# Patient Record
Sex: FEMALE | Race: WHITE | Employment: UNEMPLOYED | URBAN - METROPOLITAN AREA
[De-identification: names, ages, dates, MRNs, and addresses within clinical notes are randomized per-mention and may not be internally consistent; named-entity substitution may affect disease eponyms.]

---

## 2019-06-21 ENCOUNTER — ROUTINE PRENATAL (OUTPATIENT)
Dept: OBGYN CLINIC | Age: 29
End: 2019-06-21
Payer: MEDICARE

## 2019-06-21 VITALS
DIASTOLIC BLOOD PRESSURE: 73 MMHG | HEIGHT: 62 IN | SYSTOLIC BLOOD PRESSURE: 109 MMHG | BODY MASS INDEX: 34.08 KG/M2 | HEART RATE: 68 BPM | WEIGHT: 185.2 LBS

## 2019-06-21 DIAGNOSIS — O99.211 OBESITY AFFECTING PREGNANCY IN FIRST TRIMESTER: Primary | ICD-10-CM

## 2019-06-21 DIAGNOSIS — Z13.79 ENCOUNTER FOR OTHER SCREENING FOR GENETIC AND CHROMOSOMAL ANOMALIES: ICD-10-CM

## 2019-06-21 DIAGNOSIS — Z3A.12 12 WEEKS GESTATION OF PREGNANCY: ICD-10-CM

## 2019-06-21 LAB
GLUCOSE URINE, POC: NORMAL
PROTEIN UA: NEGATIVE

## 2019-06-21 PROCEDURE — 81002 URINALYSIS NONAUTO W/O SCOPE: CPT | Performed by: OBSTETRICS & GYNECOLOGY

## 2019-06-21 PROCEDURE — G8427 DOCREV CUR MEDS BY ELIG CLIN: HCPCS | Performed by: OBSTETRICS & GYNECOLOGY

## 2019-06-21 PROCEDURE — 76813 OB US NUCHAL MEAS 1 GEST: CPT | Performed by: OBSTETRICS & GYNECOLOGY

## 2019-06-21 PROCEDURE — 99242 OFF/OP CONSLTJ NEW/EST SF 20: CPT | Performed by: OBSTETRICS & GYNECOLOGY

## 2019-06-21 PROCEDURE — 99201 HC NEW PT, E/M LEVEL 1: CPT | Performed by: OBSTETRICS & GYNECOLOGY

## 2019-06-21 PROCEDURE — G8417 CALC BMI ABV UP PARAM F/U: HCPCS | Performed by: OBSTETRICS & GYNECOLOGY

## 2019-06-21 SDOH — HEALTH STABILITY: MENTAL HEALTH: HOW OFTEN DO YOU HAVE A DRINK CONTAINING ALCOHOL?: NEVER

## 2019-06-21 NOTE — LETTER
 Neural Tube Defect No     Congenital Heart Defect No     Down Syndrome No     Adrián-Sachs No     Sickle Cell Disease or Trait No     Hemophilia No     Muscular Dystrophy No     Cystic Fibrosis No     Dunn Chorea No     Mental Retardation/Autism No     Was Person Treated for Fragilex? No     Other Inherited Genetic Chromosomal Disorder? No     Maternal Metabolic Disorder No     Patient or [de-identified] Father Had Other Defects? Yes Pt's sons has joined toes on both feet    Recurrent Pregnancy Loss or Still Birth? No      OB Infection History    Live with Someone with or Exposed to TB? No     Patient or Partner has Hx of Genital Herpes? No     Rash or Viral Illness Since LMP? No     History of STD/GC/Chlamydia/HPV/Syphilis? No      Mrs Roula Belcher had an uneventful course of pregnancy so far. When seen today in our office she had no complaints. PHYSICAL EXAMINATION:    General Appearance:  Healthy looking, alert, no acute distress. Eyes:     No pallor, no icterus, no photophobia. Ears:     No ear drainage. Nose:     No nasal drainage, no paranasal sinus tenderness. Throat:   Mucosa moist, no oral thrush, no exudate. Neck:     No nuchal rigidity. Back:     No CVA tenderness. Abdomen:    Soft nontender. Extremities:    No pretibial pitting edema, no calf muscle tenderness. Skin:     No rashes, no lesions. BP: 109/73 Weight: 185 lb 3.2 oz (84 kg) Height: 5' 2\" (157.5 cm) Pulse: 68     Body mass index is 33.87 kg/m². Urine dipstick:  Glucose : Negative   Albumin:  Negative       An ultrasound evaluation was done in our office today. Please refer to the enclosed copy of the ultrasound report for further information. IMPRESSION:  1. A 12w1d intrauterine pregnancy. 2. Obesity. 3. Previous baby with webbing of toes    RECOMMENDATIONS/PLAN:  I discussed with the patient the following points:    1.  The benefits and limitations of the first trimester hormonal screening test along with a nuchal translucency measurement: This test can detect up to 90% of cases of fetal chromosomal anomalies. This means that 10% of the cases are going to be missed by this test.    2. The fact that the 1st trimester test does not screen for birth defects and neural tube defects. An anatomical survey of the baby by ultrasound will be necessary between 16 to 20 weeks to check for birth defects. 3. Screening for neural tube defects will involve maternal serum alpha-fetoprotein to be done in the 2nd trimester or a targeted ultrasound evaluation of the intracranial anatomy and spine. 4. The second trimester hormonal screening test (quad screen)  can detect up to 80% of fetal chromosomal anomalies. It carries however high false positive rate and requires confirmation with a genetic amniocentesis. 5. The fact that only a genetic amniocentesis can detect chromosomal anomalies. It carries, however, a risk of causing fetal loss. ( the risk of loss is quoted to be between 1:200 to 1:500). 6. She declined the genetic amniocentesis. I discussed the cell free DNA test ( [place])  with the patient. I advised her that this a screening test not a diagnostic test.The test screens only for trisomy 21, 18 and 13. She understands that the cell free DNA is  a screening test, it does not replace the diagnostic genetic amniocentesis. She agreed to have the [place] test. It was ordered today. 7. Obesity is assosiated with an increased risk of developing gestational diabetes, and disturbance in the growth of her baby, such as Large for dates and small for Dates. Gestational diabetes should be ruled out with a Glucose tolerance test. If negative it should be repeated at 26-28 weeks of gestation. 8. I recommend a second trimester fetal anatomical survey at 18-20 weeks of gestation. Thank you again, doctor, for allowing us to be of service to your patient. If I can be of further assistance, please do not hesitate to call. Sincerely,        Gabriella Johansen M.D., [address] [facility]    Current encounter billing:  [place] OFFICE CONSULTATION NEW/ESTAB PATIENT 30 MIN [83817]  US Fetal Nuchal Translucency [[id number] CPT(R)]    **This report has been created using voice recognition software.  It may contain minor errors     which are inherent in voice recognition technology**

## 2019-06-21 NOTE — PATIENT INSTRUCTIONS
Call your primary obstetrician with bleeding, leaking of fluid, abdominal tenderness, headache, blurry vision, epigastric pain and increased urinary frequency. Any questions contact Peter at [phone]. If you are experiencing an emergency and need immediate help, call 911 or go to go emergency room or labor and delivery. if you are sick, not feeling well or have an infectious process going on please reschedule your appointment by calling [phone]. Also if any family members are not feeling well, please do not bring them to your appointment. We appreciate your cooperation. We are doing this in order to protect our pregnant mothers+ their babies. Patient Education        Weeks 10 to 14 of Your Pregnancy: Care Instructions  Your Care Instructions    By weeks 10 to 14 of your pregnancy, the placenta has formed inside your uterus. It is possible to hear your baby's heartbeat with a special ultrasound device. Your baby's eyes can and do move. The arms and legs can bend. This is a good time to think about testing for birth defects. There are two types of tests: screening and diagnostic. Screening tests show the chance that a baby has a certain birth defect. They can't tell you for sure that your baby has a problem. Diagnostic tests show if a baby has a certain birth defect. It's your choice whether to have these tests. You and your partner can talk to your doctor or midwife about birth defects tests. Follow-up care is a key part of your treatment and safety. Be sure to make and go to all appointments, and call your doctor if you are having problems. It's also a good idea to know your test results and keep a list of the medicines you take. How can you care for yourself at home? Decide about tests  · You can have screening tests and diagnostic tests to check for birth defects.  The decision to have a test for birth defects is personal. Think about your age, your chance of passing on a family disease, your need to know about any problems, and what you might do after you have the test results. ? Triple or quadruple (quad) blood tests. These screening tests can be done between 15 and 20 weeks of pregnancy. They check the amounts of three or four substances in your blood. The doctor looks at these test results, along with your age and other factors, to find out the chance that your baby may have certain problems. ? Amniocentesis. This diagnostic test is used to look for chromosomal problems in the baby's cells. It can be done between 15 and 20 weeks of pregnancy, usually around week 16.  ? Nuchal translucency test. This test uses ultrasound to measure the thickness of the area at the back of the baby's neck. An increase in the thickness can be an early sign of Down syndrome. ? Chorionic villus sampling (CVS). This is a test that looks for certain genetic problems with your baby. The same genes that are in your baby are in the placenta. A small piece of the placenta is taken out and tested. This test is done when you are 10 to 13 weeks pregnant. Ease discomfort  · Slow down and take naps when you feel tired. · If your emotions swing, talk to someone. Crying, anxiety, and concentration problems are common. · If your gums bleed, try a softer toothbrush. If your gums are puffy and bleed a lot, see your dentist.  · If you feel dizzy:  ? Get up slowly after sitting or lying down. ? Drink plenty of fluids. ? Eat small snacks to keep your blood sugar stable. ? Put your head between your legs as though you were tying your shoelaces. ? Lie down with your legs higher than your head. Use pillows to prop up your feet. · If you have a headache:  ? Lie down. ? Ask your partner or a good friend for a neck massage. ? Try cool cloths over your forehead or across the back of your neck. ? Use acetaminophen (Tylenol) for pain relief.  Do not use nonsteroidal anti-inflammatory drugs (NSAIDs), such as ibuprofen (Advil, Motrin) or naproxen anytime you think you may need emergency care. For example, call if:  · You passed out (lost consciousness). Call your doctor now or seek immediate medical care if:  · You have a fever. · You have vaginal bleeding. · You are dizzy or lightheaded, or you feel like you may faint. · You have symptoms of a urinary tract infection. These may include:  ? Pain or burning when you urinate. ? A frequent need to urinate without being able to pass much urine. ? Pain in the flank, which is just below the rib cage and above the waist on either side of the back. ? Blood in your urine. · You have belly pain. · You think you are having contractions. · You have a sudden release of fluid from your vagina. Watch closely for changes in your health, and be sure to contact your doctor if:  · You have vaginal discharge that smells bad. · You have other concerns about your pregnancy. Follow-up care is a key part of your treatment and safety. Be sure to make and go to all appointments, and call your doctor if you are having problems. It's also a good idea to know your test results and keep a list of the medicines you take. Where can you learn more? Go to https://[employer] Researchpepiceweb.[email or web address]. org and sign in to your [employer] account. Enter X421 in the [employer] box to learn more about \"Learning About When to Call Your Doctor During Pregnancy (Up to 20 Weeks). \"     If you do not have an account, please click on the \"Sign Up Now\" link. Current as of: September 5, 2018  Content Version: 12.0  © 9730-6944 Healthwise, Incorporated. Care instructions adapted under license by [facility] ([facility]). If you have questions about a medical condition or this instruction, always ask your healthcare professional. [place] [address] any warranty or liability for your use of this information.

## 2019-06-21 NOTE — PROGRESS NOTES
19    RE:  Sasha Reyes   : 1990   AGE: 33 y.o. REFERRING PHYSICIAN:  Bety Mckeon MD    Dear Dr. Tha lange for referring Sasha Reyes a 33 y.o.  Ruddy Guerra who is seen today in our office. REASON FOR CONSULTATION:  · Consultation and comanagement of a pregnant for a First Trimester Hormonal Screening test.    Mrs Sasha Reyes gave the following history when I saw her today:    OB History    Para Term  AB Living   2 1 1 0 0 1   SAB TAB Ectopic Molar Multiple Live Births   0 0 0 0 0 1      # Outcome Date GA Lbr Brady/2nd Weight Sex Delivery Anes PTL Lv   2 Current            1 Term 17   7 lb 12 oz (3.515 kg) M Vag-Spont EPI N MADINA     PAST GYNECOLOGICAL  HISTORY:  Negative for abnormal pap smears requiring surgical treatment. Negative for sexually transmitted diseases. PAST MEDICAL HISTORY:   Negative for Hypertension, Diabetes, Thyroid disease , Asthma or Heart disease. PAST SURGICAL HISTORY:  Past Surgical History:   Procedure Laterality Date    WISDOM TOOTH EXTRACTION     Negative for Appendectomy, Cholecystectomy or surgery on the cervix such as LEEP, [facility] or Cryotherapy. ALLERGIES:    No Known Allergies    MEDICATIONS:    Prenatal Vitamins    SOCIAL  HISTORY: Denies smoking, Alcohol and Drug abuse. REVIEW OF SYSTEMS:    CONSTITUTIONAL : No fever, no chills   HEENT :  No headache, no visual changes, no rhinorrhea, no sore throat   CARDIOVASCULAR :  No pain, no palpitations, no edema   RESPIRATORY :  No pain, no shortness of breath   GASTROINTESTINAL : No N/V, no D/C, no abdominal pain   GENITOURINARY :  No dysuria, hematuria and no incontinence   MUSCULOSKELETAL:  No myalgia, No back pain  NEUROLOGICAL :  No numbness, no tingling, no tremors. No history of seizures    FAMILY MEDICAL HISTORY:   Negative for chromosomal anomalies and Mental retardation.     OB Genetic Screening    Patient's Age 35+ at Date of Delivery No     Thalassemia MCV<80 No     Neural Tube Defect No     Congenital Heart Defect No     Down Syndrome No     Adrián-Sachs No     Sickle Cell Disease or Trait No     Hemophilia No     Muscular Dystrophy No     Cystic Fibrosis No     Maribel Chorea No     Mental Retardation/Autism No     Was Person Treated for Fragilex? No     Other Inherited Genetic Chromosomal Disorder? No     Maternal Metabolic Disorder No     Patient or [de-identified] Father Had Other Defects? Yes Pt's sons has joined toes on both feet    Recurrent Pregnancy Loss or Still Birth? No      OB Infection History    Live with Someone with or Exposed to TB? No     Patient or Partner has Hx of Genital Herpes? No     Rash or Viral Illness Since LMP? No     History of STD/GC/Chlamydia/HPV/Syphilis? No      Mrs Gabe Osorio had an uneventful course of pregnancy so far. When seen today in our office she had no complaints. PHYSICAL EXAMINATION:    General Appearance:  Healthy looking, alert, no acute distress. Eyes:     No pallor, no icterus, no photophobia. Ears:     No ear drainage. Nose:     No nasal drainage, no paranasal sinus tenderness. Throat:   Mucosa moist, no oral thrush, no exudate. Neck:     No nuchal rigidity. Back:     No CVA tenderness. Abdomen:    Soft nontender. Extremities:    No pretibial pitting edema, no calf muscle tenderness. Skin:     No rashes, no lesions. BP: 109/73 Weight: 185 lb 3.2 oz (84 kg) Height: 5' 2\" (157.5 cm) Pulse: 68     Body mass index is 33.87 kg/m². Urine dipstick:  Glucose : Negative   Albumin:  Negative       An ultrasound evaluation was done in our office today. Please refer to the enclosed copy of the ultrasound report for further information. IMPRESSION:  1. A 12w1d intrauterine pregnancy. 2. Obesity. 3. Previous baby with webbing of toes    RECOMMENDATIONS/PLAN:  I discussed with the patient the following points:    1.  The benefits and limitations of the first trimester hormonal screening test along with a nuchal translucency measurement: This test can detect up to 90% of cases of fetal chromosomal anomalies. This means that 10% of the cases are going to be missed by this test.    2. The fact that the 1st trimester test does not screen for birth defects and neural tube defects. An anatomical survey of the baby by ultrasound will be necessary between 16 to 20 weeks to check for birth defects. 3. Screening for neural tube defects will involve maternal serum alpha-fetoprotein to be done in the 2nd trimester or a targeted ultrasound evaluation of the intracranial anatomy and spine. 4. The second trimester hormonal screening test (quad screen)  can detect up to 80% of fetal chromosomal anomalies. It carries however high false positive rate and requires confirmation with a genetic amniocentesis. 5. The fact that only a genetic amniocentesis can detect chromosomal anomalies. It carries, however, a risk of causing fetal loss. ( the risk of loss is quoted to be between 1:200 to 1:500). 6. She declined the genetic amniocentesis. I discussed the cell free DNA test ( [place])  with the patient. I advised her that this a screening test not a diagnostic test.The test screens only for trisomy 21, 18 and 13. She understands that the cell free DNA is  a screening test, it does not replace the diagnostic genetic amniocentesis. She agreed to have the [place] test. It was ordered today. 7. Obesity is assosiated with an increased risk of developing gestational diabetes, and disturbance in the growth of her baby, such as Large for dates and small for Dates. Gestational diabetes should be ruled out with a Glucose tolerance test. If negative it should be repeated at 26-28 weeks of gestation. 8. I recommend a second trimester fetal anatomical survey at 18-20 weeks of gestation. Thank you again, doctor, for allowing us to be of service to your patient. If I can be of further assistance, please do not hesitate to call. Sincerely,        Oda Baumgarten, M.D., [address] [facility]    Current encounter billing:  [place] OFFICE CONSULTATION NEW/ESTAB PATIENT 30 MIN [[id number]]  US Fetal Nuchal Translucency [[id number] CPT(R)]    **This report has been created using voice recognition software.  It may contain minor errors     which are inherent in voice recognition technology**

## 2019-07-03 ENCOUNTER — TELEPHONE (OUTPATIENT)
Dept: OBGYN CLINIC | Age: 29
End: 2019-07-03

## 2019-08-09 ENCOUNTER — ROUTINE PRENATAL (OUTPATIENT)
Dept: OBGYN CLINIC | Age: 29
End: 2019-08-09
Payer: MEDICARE

## 2019-08-09 VITALS
HEIGHT: 62 IN | BODY MASS INDEX: 34.04 KG/M2 | DIASTOLIC BLOOD PRESSURE: 68 MMHG | SYSTOLIC BLOOD PRESSURE: 110 MMHG | WEIGHT: 185 LBS | HEART RATE: 77 BPM

## 2019-08-09 DIAGNOSIS — Z3A.19 19 WEEKS GESTATION OF PREGNANCY: ICD-10-CM

## 2019-08-09 DIAGNOSIS — O35.2XX2: ICD-10-CM

## 2019-08-09 DIAGNOSIS — O99.212 OBESITY AFFECTING PREGNANCY IN SECOND TRIMESTER: Primary | ICD-10-CM

## 2019-08-09 DIAGNOSIS — Z36.89 ENCOUNTER FOR FETAL ANATOMIC SURVEY: ICD-10-CM

## 2019-08-09 LAB
GLUCOSE URINE, POC: NEGATIVE
PROTEIN UA: NEGATIVE

## 2019-08-09 PROCEDURE — G8417 CALC BMI ABV UP PARAM F/U: HCPCS | Performed by: OBSTETRICS & GYNECOLOGY

## 2019-08-09 PROCEDURE — G8427 DOCREV CUR MEDS BY ELIG CLIN: HCPCS | Performed by: OBSTETRICS & GYNECOLOGY

## 2019-08-09 PROCEDURE — 99211 OFF/OP EST MAY X REQ PHY/QHP: CPT | Performed by: OBSTETRICS & GYNECOLOGY

## 2019-08-09 PROCEDURE — 76811 OB US DETAILED SNGL FETUS: CPT | Performed by: OBSTETRICS & GYNECOLOGY

## 2019-08-09 PROCEDURE — 1036F TOBACCO NON-USER: CPT | Performed by: OBSTETRICS & GYNECOLOGY

## 2019-08-09 PROCEDURE — 81002 URINALYSIS NONAUTO W/O SCOPE: CPT | Performed by: OBSTETRICS & GYNECOLOGY

## 2019-08-09 PROCEDURE — 99213 OFFICE O/P EST LOW 20 MIN: CPT | Performed by: OBSTETRICS & GYNECOLOGY

## 2019-08-09 NOTE — PATIENT INSTRUCTIONS
Please arrive for your scheduled appointment at least 15 minutes early with your actual insurance card+ a photo ID. Also if you need any refills ordered or have questions, it may take up 48 hours to reply. Please allow ample time for your refills. Call me when you use last refill. Thank you for your cooperation. Call your primary obstetrician with bleeding, leaking of fluid, abdominal tenderness, headache, blurry vision, epigastric pain and increased urinary frequency. Any questions contact Peter at [phone]. If you are experiencing an emergency and need immediate help, call 911 or go to go emergency room or labor and delivery. if you are sick, not feeling well or have an infectious process going on please reschedule your appointment by calling [phone]. Also if any family members are not feeling well, please do not bring them to your appointment. We appreciate your cooperation. We are doing this in order to protect our pregnant mothers+ their babies. Patient Education        Weeks 18 to 22 of Your Pregnancy: Care Instructions  Your Care Instructions    Your baby is continuing to develop quickly. At this stage, babies can now suck their thumbs,  firmly with their hands, and open and close their eyelids. Sometime between 18 and 22 weeks, you will start to feel your baby move. At first, these small fetal movements feel like fluttering or \"butterflies. \" Some women say that they feel like gas bubbles. As the baby grows, these movements will become stronger. You may also notice that your baby kicks and hiccups. During this time, you may find that your nausea and fatigue are gone. Overall, you may feel better and have more energy than you did in your first trimester. But you may also have new discomforts now, such as sleep problems or leg cramps. This care sheet can help you ease these discomforts. Follow-up care is a key part of your treatment and safety.  Be sure to make and go to all appointments, and Learning About When to Call Your Doctor During Pregnancy (Up to 20 Weeks)  Your Care Instructions    It's common to have concerns about what might be a problem during pregnancy. Although most pregnant women don't have any serious problems, it's important to know when to call your doctor if you have certain symptoms. These are general suggestions. Your doctor may give you some more information about when to call. When to call your doctor (up to 20 weeks)  Call 911 anytime you think you may need emergency care. For example, call if:  · You passed out (lost consciousness). Call your doctor now or seek immediate medical care if:  · You have a fever. · You have vaginal bleeding. · You are dizzy or lightheaded, or you feel like you may faint. · You have symptoms of a urinary tract infection. These may include:  ? Pain or burning when you urinate. ? A frequent need to urinate without being able to pass much urine. ? Pain in the flank, which is just below the rib cage and above the waist on either side of the back. ? Blood in your urine. · You have belly pain. · You think you are having contractions. · You have a sudden release of fluid from your vagina. Watch closely for changes in your health, and be sure to contact your doctor if:  · You have vaginal discharge that smells bad. · You have other concerns about your pregnancy. Follow-up care is a key part of your treatment and safety. Be sure to make and go to all appointments, and call your doctor if you are having problems. It's also a good idea to know your test results and keep a list of the medicines you take. Where can you learn more? Go to https://herson.testbirds. org and sign in to your [employer] account. Enter K270 in the [employer] box to learn more about \"Learning About When to Call Your Doctor During Pregnancy (Up to 20 Weeks). \"     If you do not have an account, please click on the \"Sign Up Now\" link.   Current as of:

## 2019-08-09 NOTE — PROGRESS NOTES
Pt denies bleeding,lof,crampingAll questions answered+ information confirmed by pt
defects in the general population is 2- 4%. 2. No structural  anomalies are noted. Only genetic amniocentesis can rule out fetal chromosome anomalies. Normal ultrasound does not. 3. The size of her baby is appropriate for gestational age. 4. Based on her age and the absence of chromosomal markers by ultrasound today, the risk of chromosomal anomalies is small and does not indicate a need for an amniocentesis, a procedure that might cause pregnancy loss ( the risk of loss is quoted to be between 1:200 to 1:500). 5. An amniocentesis is indicated if fetal structural defects are seen or if the first Trimester,  second trimester hormonal screening test (quad screen) , or if the cell free DNA test NIPT: non-invasive prenatal test) showed an increase risk for Chromosomal anomalies. 6.  She was reassured by the result of the cell free DNA test. I explained to her that this a screening test not a diagnostic test. It does not replace the diagnostic genetic amniocentesis. It screens only for trisomy 24, [address].  7. Obesity is assosiated with an increased risk of developing gestational diabetes, and disturbance in the growth of her baby, such as Large for dates and small for Dates. Gestational diabetes should be ruled out with a Glucose tolerance test. If negative it should be repeated at 26-28 weeks of gestation. 8. She is to continue to follow with you in your office for ongoing obstetric care. 9. I recommend follow-up ultrasound evaluation in our [facility] office in 10 weeks to check on fetal well-being anatomy and growth. Thank you again, doctor, for allowing us to be of service to your patient. If I can be of further assistance, please do not hesitate to call.       Sincerely,        César Cardoso M.D., [place]    Current encounter billing:  [place] OFFICE OUTPATIENT VISIT 15 MINUTES [[id number]]  US OB Detail Fetal Anatomy Single or 1st [[id number] Custom]    **This report has been created using voice recognition

## 2019-08-09 NOTE — LETTER
19     RE:  Chanel Rodriguez   : 1990   AGE: 33 y.o. REFERRING PHYSICIAN:  Chinyere Lu MD    Dear   Mrs. Chanel Rodriguez a 33 y.o.  Marlen Albarran  is seen today on follow up in our office. REASON FOR APPOINTMENT:  · Follow-up on a pregnant patient with obesity and a previous baby with webbing of the toes. MEDICATIONS:    Prenatal Vitamins    INTERVAL HISTORY:  Mrs Chanel Rodriguez had an uneventful course of pregnancy since her last visit to our office. When seen today in our office she had no complaints. PHYSICAL EXAMINATION:  General Appearance:  Healthy looking, alert, no acute distress. Eyes:     No pallor, no icterus, no photophobia. Ears:     No ear drainage. Nose:     No nasal drainage, no paranasal sinus tenderness. Throat:   Mucosa moist, no oral thrush, no exudate. Neck:     No nuchal rigidity. Back:     No CVA tenderness. Abdomen:    Soft nontender. Extremities:    No pretibial pitting edema, no calf muscle tenderness. Skin:     No rashes, no lesions. BP: 110/68 Weight: 185 lb (83.9 kg) Height: 5' 2\" (157.5 cm) Pulse: 77     Body mass index is 33.84 kg/m². Urine dipstick:  Glucose : Negative   Albumin:  Trace       An ultrasound evaluation was done in our office today. Please refer to the enclosed copy of the ultrasound report for further information. Chart and Lab Work Review:  I reviewed with the patient the result of the:  · Cell free DNA test collected on 2019 that showed low probability of having baby with trisomy 24, 25 or 13. IMPRESSION:  1. A  19w2d  intrauterine gestation. 2. Obesity. 3. Previous baby with webbing of toes. 4. Declined transvaginal ultrasound evaluation to check on the risk of  delivery. RECOMMENDATIONS/PLAN:  I discussed with the patient the following points:    1. The benefits and limitations of ultrasound in prenatal diagnosis. Some defects might not always be seen by ultrasound.   Estimated incidence of

## 2019-10-24 ENCOUNTER — ROUTINE PRENATAL (OUTPATIENT)
Dept: OBGYN CLINIC | Age: 29
End: 2019-10-24
Payer: MEDICARE

## 2019-10-24 VITALS
HEIGHT: 62 IN | BODY MASS INDEX: 36.25 KG/M2 | SYSTOLIC BLOOD PRESSURE: 111 MMHG | WEIGHT: 197 LBS | DIASTOLIC BLOOD PRESSURE: 68 MMHG | HEART RATE: 108 BPM

## 2019-10-24 DIAGNOSIS — O99.213 OBESITY AFFECTING PREGNANCY IN THIRD TRIMESTER: Primary | ICD-10-CM

## 2019-10-24 DIAGNOSIS — Z36.89 ENCOUNTER FOR FETAL ANATOMIC SURVEY: ICD-10-CM

## 2019-10-24 DIAGNOSIS — O35.2XX0 HEREDITARY DISEASE IN FAMILY POSSIBLY AFFECTING FETUS, AFFECTING MANAGEMENT OF MOTHER IN PREGNANCY, SINGLE OR UNSPECIFIED FETUS: ICD-10-CM

## 2019-10-24 DIAGNOSIS — Z3A.30 30 WEEKS GESTATION OF PREGNANCY: ICD-10-CM

## 2019-10-24 LAB
GLUCOSE URINE, POC: NORMAL
PROTEIN UA: NEGATIVE

## 2019-10-24 PROCEDURE — G8484 FLU IMMUNIZE NO ADMIN: HCPCS | Performed by: OBSTETRICS & GYNECOLOGY

## 2019-10-24 PROCEDURE — G8417 CALC BMI ABV UP PARAM F/U: HCPCS | Performed by: OBSTETRICS & GYNECOLOGY

## 2019-10-24 PROCEDURE — 81002 URINALYSIS NONAUTO W/O SCOPE: CPT | Performed by: OBSTETRICS & GYNECOLOGY

## 2019-10-24 PROCEDURE — 99211 OFF/OP EST MAY X REQ PHY/QHP: CPT | Performed by: OBSTETRICS & GYNECOLOGY

## 2019-10-24 PROCEDURE — 1036F TOBACCO NON-USER: CPT | Performed by: OBSTETRICS & GYNECOLOGY

## 2019-10-24 PROCEDURE — 76819 FETAL BIOPHYS PROFIL W/O NST: CPT | Performed by: OBSTETRICS & GYNECOLOGY

## 2019-10-24 PROCEDURE — 76805 OB US >/= 14 WKS SNGL FETUS: CPT | Performed by: OBSTETRICS & GYNECOLOGY

## 2019-10-24 PROCEDURE — 99213 OFFICE O/P EST LOW 20 MIN: CPT | Performed by: OBSTETRICS & GYNECOLOGY

## 2019-10-24 PROCEDURE — G8427 DOCREV CUR MEDS BY ELIG CLIN: HCPCS | Performed by: OBSTETRICS & GYNECOLOGY

## 2019-12-06 ENCOUNTER — HOSPITAL ENCOUNTER (OUTPATIENT)
Age: 29
Discharge: HOME | End: 2019-12-08
Payer: MEDICARE

## 2019-12-06 DIAGNOSIS — Z3A.36 36 WEEKS GESTATION OF PREGNANCY: ICD-10-CM

## 2019-12-06 PROCEDURE — 87147 CULTURE TYPE IMMUNOLOGIC: CPT

## 2019-12-06 PROCEDURE — 87081 CULTURE SCREEN ONLY: CPT

## 2019-12-08 LAB
GROUP B STREP CULTURE: ABNORMAL
ORGANISM: ABNORMAL

## 2019-12-13 ENCOUNTER — HOSPITAL ENCOUNTER (OUTPATIENT)
Age: 29
Discharge: HOME | End: 2019-12-15
Payer: MEDICARE

## 2019-12-13 DIAGNOSIS — Z3A.37 37 WEEKS GESTATION OF PREGNANCY: ICD-10-CM

## 2019-12-13 DIAGNOSIS — O23.43 UTI (URINARY TRACT INFECTION) DURING PREGNANCY, THIRD TRIMESTER: ICD-10-CM

## 2019-12-13 PROCEDURE — 87088 URINE BACTERIA CULTURE: CPT

## 2019-12-15 LAB — URINE CULTURE, ROUTINE: NORMAL

## 2020-01-03 ENCOUNTER — HOSPITAL ENCOUNTER (INPATIENT)
Age: 30
LOS: 3 days | Discharge: HOME | DRG: 560 | End: 2020-01-06
Attending: OBSTETRICS & GYNECOLOGY | Admitting: OBSTETRICS & GYNECOLOGY
Payer: MEDICARE

## 2020-01-03 PROBLEM — Z3A.40 40 WEEKS GESTATION OF PREGNANCY: Status: ACTIVE | Noted: 2020-01-03

## 2020-01-03 PROCEDURE — 1220000001 HC SEMI PRIVATE L&D R&B

## 2020-01-03 RX ORDER — SODIUM CHLORIDE, SODIUM LACTATE, POTASSIUM CHLORIDE, CALCIUM CHLORIDE 600; 310; 30; 20 MG/100ML; MG/100ML; MG/100ML; MG/100ML
INJECTION, SOLUTION INTRAVENOUS CONTINUOUS
Status: DISCONTINUED | OUTPATIENT
Start: 2020-01-04 | End: 2020-01-04

## 2020-01-03 RX ORDER — SODIUM CHLORIDE 0.9 % (FLUSH) 0.9 %
10 SYRINGE (ML) INJECTION PRN
Status: DISCONTINUED | OUTPATIENT
Start: 2020-01-03 | End: 2020-01-04

## 2020-01-03 RX ORDER — ONDANSETRON 2 MG/ML
4 INJECTION INTRAMUSCULAR; INTRAVENOUS EVERY 6 HOURS PRN
Status: DISCONTINUED | OUTPATIENT
Start: 2020-01-03 | End: 2020-01-04

## 2020-01-03 RX ORDER — PENICILLIN G 3000000 [IU]/50ML
3 INJECTION, SOLUTION INTRAVENOUS EVERY 4 HOURS
Status: DISCONTINUED | OUTPATIENT
Start: 2020-01-04 | End: 2020-01-04

## 2020-01-03 RX ORDER — SODIUM CHLORIDE 0.9 % (FLUSH) 0.9 %
10 SYRINGE (ML) INJECTION EVERY 12 HOURS SCHEDULED
Status: DISCONTINUED | OUTPATIENT
Start: 2020-01-04 | End: 2020-01-04

## 2020-01-04 ENCOUNTER — ANESTHESIA (OUTPATIENT)
Dept: LABOR AND DELIVERY | Age: 30
DRG: 560 | End: 2020-01-04
Payer: MEDICARE

## 2020-01-04 ENCOUNTER — ANESTHESIA EVENT (OUTPATIENT)
Dept: LABOR AND DELIVERY | Age: 30
DRG: 560 | End: 2020-01-04
Payer: MEDICARE

## 2020-01-04 LAB
ABO/RH: NORMAL
AMPHETAMINE SCREEN, URINE: NOT DETECTED
ANTIBODY IDENTIFICATION: NORMAL
ANTIBODY SCREEN: NORMAL
BARBITURATE SCREEN URINE: NOT DETECTED
BENZODIAZEPINE SCREEN, URINE: NOT DETECTED
CANNABINOID SCREEN URINE: NOT DETECTED
COCAINE METABOLITE SCREEN URINE: NOT DETECTED
DAT POLYSPECIFIC: NORMAL
FENTANYL SCREEN, URINE: NOT DETECTED
HCT VFR BLD CALC: 37.9 % (ref 34–48)
HEMOGLOBIN: 12.9 G/DL (ref 11.5–15.5)
Lab: NORMAL
MCH RBC QN AUTO: 31.6 PG (ref 26–35)
MCHC RBC AUTO-ENTMCNC: 34 % (ref 32–34.5)
MCV RBC AUTO: 92.9 FL (ref 80–99.9)
METHADONE SCREEN, URINE: NOT DETECTED
OPIATE SCREEN URINE: NOT DETECTED
OXYCODONE URINE: NOT DETECTED
PDW BLD-RTO: 13.2 FL (ref 11.5–15)
PHENCYCLIDINE SCREEN URINE: NOT DETECTED
PLATELET # BLD: 198 E9/L (ref 130–450)
PMV BLD AUTO: 10.7 FL (ref 7–12)
RBC # BLD: 4.08 E12/L (ref 3.5–5.5)
WBC # BLD: 8.2 E9/L (ref 4.5–11.5)

## 2020-01-04 PROCEDURE — 86880 COOMBS TEST DIRECT: CPT

## 2020-01-04 PROCEDURE — 2500000003 HC RX 250 WO HCPCS: Performed by: OBSTETRICS & GYNECOLOGY

## 2020-01-04 PROCEDURE — 36415 COLL VENOUS BLD VENIPUNCTURE: CPT

## 2020-01-04 PROCEDURE — 1220000000 HC SEMI PRIVATE OB R&B

## 2020-01-04 PROCEDURE — 2580000003 HC RX 258: Performed by: OBSTETRICS & GYNECOLOGY

## 2020-01-04 PROCEDURE — 85027 COMPLETE CBC AUTOMATED: CPT

## 2020-01-04 PROCEDURE — 6370000000 HC RX 637 (ALT 250 FOR IP): Performed by: OBSTETRICS & GYNECOLOGY

## 2020-01-04 PROCEDURE — 86900 BLOOD TYPING SEROLOGIC ABO: CPT

## 2020-01-04 PROCEDURE — 86850 RBC ANTIBODY SCREEN: CPT

## 2020-01-04 PROCEDURE — 6360000002 HC RX W HCPCS: Performed by: OBSTETRICS & GYNECOLOGY

## 2020-01-04 PROCEDURE — 3700000025 EPIDURAL BLOCK: Performed by: ANESTHESIOLOGY

## 2020-01-04 PROCEDURE — 51701 INSERT BLADDER CATHETER: CPT

## 2020-01-04 PROCEDURE — 86901 BLOOD TYPING SEROLOGIC RH(D): CPT

## 2020-01-04 PROCEDURE — 80307 DRUG TEST PRSMV CHEM ANLYZR: CPT

## 2020-01-04 PROCEDURE — 86870 RBC ANTIBODY IDENTIFICATION: CPT

## 2020-01-04 PROCEDURE — 2500000003 HC RX 250 WO HCPCS: Performed by: ANESTHESIOLOGY

## 2020-01-04 PROCEDURE — 7200000001 HC VAGINAL DELIVERY

## 2020-01-04 RX ORDER — EPHEDRINE SULFATE 50 MG/ML
5 INJECTION, SOLUTION INTRAVENOUS PRN
Status: DISCONTINUED | OUTPATIENT
Start: 2020-01-04 | End: 2020-01-04

## 2020-01-04 RX ORDER — LIDOCAINE HYDROCHLORIDE 10 MG/ML
INJECTION, SOLUTION INFILTRATION; PERINEURAL
Status: DISCONTINUED
Start: 2020-01-04 | End: 2020-01-04

## 2020-01-04 RX ORDER — BISACODYL 10 MG
10 SUPPOSITORY, RECTAL RECTAL DAILY PRN
Status: DISCONTINUED | OUTPATIENT
Start: 2020-01-04 | End: 2020-01-06 | Stop reason: HOSPADM

## 2020-01-04 RX ORDER — HYDROCODONE BITARTRATE AND ACETAMINOPHEN 5; 325 MG/1; MG/1
1 TABLET ORAL EVERY 4 HOURS PRN
Status: DISCONTINUED | OUTPATIENT
Start: 2020-01-04 | End: 2020-01-06 | Stop reason: HOSPADM

## 2020-01-04 RX ORDER — NALOXONE HYDROCHLORIDE 0.4 MG/ML
0.4 INJECTION, SOLUTION INTRAMUSCULAR; INTRAVENOUS; SUBCUTANEOUS PRN
Status: DISCONTINUED | OUTPATIENT
Start: 2020-01-04 | End: 2020-01-04

## 2020-01-04 RX ORDER — ONDANSETRON 4 MG/1
8 TABLET, FILM COATED ORAL EVERY 8 HOURS PRN
Status: DISCONTINUED | OUTPATIENT
Start: 2020-01-04 | End: 2020-01-06 | Stop reason: HOSPADM

## 2020-01-04 RX ORDER — PHYTONADIONE 1 MG/.5ML
INJECTION, EMULSION INTRAMUSCULAR; INTRAVENOUS; SUBCUTANEOUS
Status: DISCONTINUED
Start: 2020-01-04 | End: 2020-01-04 | Stop reason: WASHOUT

## 2020-01-04 RX ORDER — IBUPROFEN 800 MG/1
800 TABLET ORAL EVERY 8 HOURS
Status: DISCONTINUED | OUTPATIENT
Start: 2020-01-05 | End: 2020-01-06 | Stop reason: HOSPADM

## 2020-01-04 RX ORDER — LANOLIN 100 %
OINTMENT (GRAM) TOPICAL PRN
Status: DISCONTINUED | OUTPATIENT
Start: 2020-01-04 | End: 2020-01-06 | Stop reason: HOSPADM

## 2020-01-04 RX ORDER — ACETAMINOPHEN 325 MG/1
650 TABLET ORAL EVERY 6 HOURS PRN
Status: DISCONTINUED | OUTPATIENT
Start: 2020-01-04 | End: 2020-01-04

## 2020-01-04 RX ORDER — ONDANSETRON 2 MG/ML
4 INJECTION INTRAMUSCULAR; INTRAVENOUS EVERY 6 HOURS PRN
Status: DISCONTINUED | OUTPATIENT
Start: 2020-01-04 | End: 2020-01-04

## 2020-01-04 RX ORDER — ACETAMINOPHEN 650 MG
TABLET, EXTENDED RELEASE ORAL
Status: COMPLETED
Start: 2020-01-04 | End: 2020-01-04

## 2020-01-04 RX ORDER — LIDOCAINE HYDROCHLORIDE 10 MG/ML
20 INJECTION, SOLUTION EPIDURAL; INFILTRATION; INTRACAUDAL; PERINEURAL ONCE
Status: COMPLETED | OUTPATIENT
Start: 2020-01-04 | End: 2020-01-04

## 2020-01-04 RX ORDER — SIMETHICONE 80 MG
80 TABLET,CHEWABLE ORAL EVERY 6 HOURS PRN
Status: DISCONTINUED | OUTPATIENT
Start: 2020-01-04 | End: 2020-01-06 | Stop reason: HOSPADM

## 2020-01-04 RX ORDER — FERROUS SULFATE 325(65) MG
325 TABLET ORAL 2 TIMES DAILY WITH MEALS
Status: DISCONTINUED | OUTPATIENT
Start: 2020-01-04 | End: 2020-01-06 | Stop reason: HOSPADM

## 2020-01-04 RX ORDER — ERYTHROMYCIN 5 MG/G
OINTMENT OPHTHALMIC
Status: DISCONTINUED
Start: 2020-01-04 | End: 2020-01-04 | Stop reason: WASHOUT

## 2020-01-04 RX ORDER — ACETAMINOPHEN 325 MG/1
650 TABLET ORAL EVERY 4 HOURS PRN
Status: DISCONTINUED | OUTPATIENT
Start: 2020-01-04 | End: 2020-01-06 | Stop reason: HOSPADM

## 2020-01-04 RX ORDER — NALBUPHINE HCL 10 MG/ML
5 AMPUL (ML) INJECTION EVERY 4 HOURS PRN
Status: DISCONTINUED | OUTPATIENT
Start: 2020-01-04 | End: 2020-01-04

## 2020-01-04 RX ORDER — ACETAMINOPHEN 650 MG
TABLET, EXTENDED RELEASE ORAL PRN
Status: DISCONTINUED | OUTPATIENT
Start: 2020-01-04 | End: 2020-01-04

## 2020-01-04 RX ORDER — DOCUSATE SODIUM 100 MG/1
100 CAPSULE, LIQUID FILLED ORAL 2 TIMES DAILY
Status: DISCONTINUED | OUTPATIENT
Start: 2020-01-04 | End: 2020-01-06 | Stop reason: HOSPADM

## 2020-01-04 RX ADMIN — Medication: at 14:26

## 2020-01-04 RX ADMIN — PENICILLIN G 3 MILLION UNITS: 3000000 INJECTION, SOLUTION INTRAVENOUS at 12:45

## 2020-01-04 RX ADMIN — DEXTROSE MONOHYDRATE 5 MILLION UNITS: 50 INJECTION, SOLUTION INTRAVENOUS at 00:10

## 2020-01-04 RX ADMIN — Medication 5 ML: at 06:13

## 2020-01-04 RX ADMIN — Medication 999 MILLI-UNITS/MIN: at 14:40

## 2020-01-04 RX ADMIN — Medication 5 ML: at 06:16

## 2020-01-04 RX ADMIN — PENICILLIN G 3 MILLION UNITS: 3000000 INJECTION, SOLUTION INTRAVENOUS at 04:05

## 2020-01-04 RX ADMIN — Medication 1 MILLI-UNITS/MIN: at 00:15

## 2020-01-04 RX ADMIN — Medication 15 ML/HR: at 06:18

## 2020-01-04 RX ADMIN — SODIUM CHLORIDE, POTASSIUM CHLORIDE, SODIUM LACTATE AND CALCIUM CHLORIDE: 600; 310; 30; 20 INJECTION, SOLUTION INTRAVENOUS at 13:53

## 2020-01-04 RX ADMIN — LIDOCAINE HYDROCHLORIDE 20 ML: 10 INJECTION, SOLUTION EPIDURAL; INFILTRATION; INTRACAUDAL; PERINEURAL at 14:27

## 2020-01-04 RX ADMIN — DOCUSATE SODIUM 100 MG: 100 CAPSULE, LIQUID FILLED ORAL at 19:33

## 2020-01-04 RX ADMIN — ACETAMINOPHEN 650 MG: 325 TABLET ORAL at 08:37

## 2020-01-04 RX ADMIN — SODIUM CHLORIDE, POTASSIUM CHLORIDE, SODIUM LACTATE AND CALCIUM CHLORIDE: 600; 310; 30; 20 INJECTION, SOLUTION INTRAVENOUS at 07:41

## 2020-01-04 RX ADMIN — PENICILLIN G 3 MILLION UNITS: 3000000 INJECTION, SOLUTION INTRAVENOUS at 08:26

## 2020-01-04 ASSESSMENT — PAIN DESCRIPTION - DESCRIPTORS
DESCRIPTORS: ACHING
DESCRIPTORS: CRAMPING

## 2020-01-04 ASSESSMENT — PAIN DESCRIPTION - LOCATION
LOCATION: ABDOMEN
LOCATION: HEAD

## 2020-01-04 ASSESSMENT — PAIN DESCRIPTION - PAIN TYPE
TYPE: ACUTE PAIN
TYPE: ACUTE PAIN

## 2020-01-04 ASSESSMENT — PAIN SCALES - GENERAL
PAINLEVEL_OUTOF10: 7
PAINLEVEL_OUTOF10: 1

## 2020-01-04 ASSESSMENT — PAIN DESCRIPTION - ONSET
ONSET: ON-GOING
ONSET: GRADUAL

## 2020-01-04 ASSESSMENT — PAIN DESCRIPTION - FREQUENCY
FREQUENCY: INTERMITTENT
FREQUENCY: INTERMITTENT

## 2020-01-04 ASSESSMENT — PAIN - FUNCTIONAL ASSESSMENT
PAIN_FUNCTIONAL_ASSESSMENT: ACTIVITIES ARE NOT PREVENTED
PAIN_FUNCTIONAL_ASSESSMENT: ACTIVITIES ARE NOT PREVENTED

## 2020-01-04 ASSESSMENT — PAIN DESCRIPTION - ORIENTATION
ORIENTATION: ANTERIOR
ORIENTATION: LOWER

## 2020-01-04 ASSESSMENT — PAIN DESCRIPTION - PROGRESSION
CLINICAL_PROGRESSION: GRADUALLY WORSENING
CLINICAL_PROGRESSION: GRADUALLY WORSENING

## 2020-01-04 ASSESSMENT — LIFESTYLE VARIABLES: SMOKING_STATUS: 0

## 2020-01-04 NOTE — H&P
CHIEF COMPLAINT:  contractions    HISTORY OF PRESENT ILLNESS:      The patient is a 26 y.o. female at 44w3d. OB History        2    Para   1    Term   1            AB        Living   1       SAB        TAB        Ectopic        Molar        Multiple        Live Births   1            Patient presents with a chief complaint as above and is being admitted for latent labor    Estimated Due Date: Estimated Date of Delivery: 20    PRENATAL CARE:    Complicated by: none    PAST OB HISTORY  OB History        2    Para   1    Term   1            AB        Living   1       SAB        TAB        Ectopic        Molar        Multiple        Live Births   1                Past Medical History:    No past medical history on file. Past Surgical History:        Procedure Laterality Date    WISDOM TOOTH EXTRACTION       Allergies:  Patient has no known allergies.   Social History:    Social History     Socioeconomic History    Marital status: Single     Spouse name: Not on file    Number of children: Not on file    Years of education: Not on file    Highest education level: Not on file   Occupational History    Not on file   Social Needs    Financial resource strain: Not on file    Food insecurity:     Worry: Not on file     Inability: Not on file    Transportation needs:     Medical: Not on file     Non-medical: Not on file   Tobacco Use    Smoking status: Never Smoker    Smokeless tobacco: Never Used   Substance and Sexual Activity    Alcohol use: Not Currently     Frequency: Never    Drug use: Never    Sexual activity: Not Currently     Partners: Male   Lifestyle    Physical activity:     Days per week: Not on file     Minutes per session: Not on file    Stress: Not on file   Relationships    Social connections:     Talks on phone: Not on file     Gets together: Not on file     Attends [employer] service: Not on file     Active member of club or organization: Not on file Attends meetings of clubs or organizations: Not on file     Relationship status: Not on file    Intimate partner violence:     Fear of current or ex partner: Not on file     Emotionally abused: Not on file     Physically abused: Not on file     Forced sexual activity: Not on file   Other Topics Concern    Not on file   Social History Narrative    Not on file     Family History:       Problem Relation Age of Onset    Diabetes Maternal Grandmother     COPD Maternal Grandmother     Hypertension Maternal Grandfather      Medications Prior to Admission:  Medications Prior to Admission: loratadine (CLARITIN) 10 MG tablet,   Prenatal Vit-Fe Fumarate-FA (PRENATAL VITAMIN PO), Take 1 tablet by mouth    REVIEW OF SYSTEMS:    CONSTITUTIONAL:  negative  RESPIRATORY:  negative  CARDIOVASCULAR:  negative  GASTROINTESTINAL:  negative  ALLERGIC/IMMUNOLOGIC:  negative  NEUROLOGICAL:  negative  BEHAVIOR/PSYCH:  negative    PHYSICAL EXAM:  Vitals:    01/03/20 2229 01/03/20 2238   BP: 127/76 127/76   Pulse: 71 71   Resp:  18   Temp:  98.3 °F (36.8 °C)   TempSrc:  Oral     General appearance:  awake, alert, cooperative, no apparent distress, and appears stated age  Neurologic:  Awake, alert, oriented to name, place and time. Lungs:  No increased work of breathing, good air exchange  Abdomen:  Soft, non tender, gravid, consistent with her gestational age   Fetal heart rate:  Reassuring.   Pelvis:  Adequate pelvis  Cervix: 2-3 60% soft -2  Contraction frequency:  2-3 minutes    Membranes:  Intact    ASSESSMENT AND PLAN:    Labor: admit  Fetus: Reassuring  GBS: Yes  Other: pitocin, pcn      Electronically signed by Karla Sierra DO on 1/3/2020 at 11:27 PM

## 2020-01-04 NOTE — L&D DELIVERY NOTE
Hugh Elisa  26 y.o. Orie Schaumann at Gestational Age: 36 2/7wks delivered via spontaneous vaginal under epidural anesthesia over rml episiotomy a male infant at 56  Weighing 10# Apgars 8,9. Placenta with 3VC. No lacerations. Shoulder delivered with Altagracia, and suprapubic pressure. Episiotomy repair with 3-0 vicyrl. EBL 100cc. Cord gases were obtained.     Roseanna Osullivan  1/4/2020 2:54 PM

## 2020-01-04 NOTE — ANESTHESIA PRE PROCEDURE
Department of Anesthesiology  Preprocedure Note       Name:  Nereida Abdul   Age:  26 y.o.  :  1990                                          MRN:  [id number]         Date:  2020      Surgeon: Dr. Ailin Mallory    Procedure: Labor Analgesia    Medications prior to admission:   Prior to Admission medications    Medication Sig Start Date End Date Taking?  Authorizing Provider   loratadine (CLARITIN) 10 MG tablet  19   Historical Provider, MD   Prenatal Vit-Fe Fumarate-FA (PRENATAL VITAMIN PO) Take 1 tablet by mouth    Historical Provider, MD       Current medications:    Current Facility-Administered Medications   Medication Dose Route Frequency Provider Last Rate Last Dose    lactated ringers infusion   Intravenous Continuous Nova Pall, DO        sodium chloride flush 0.9 % injection 10 mL  10 mL Intravenous 2 times per day Nova Pall, DO        sodium chloride flush 0.9 % injection 10 mL  10 mL Intravenous PRN Nova Pall, DO        ondansetron [facility]) injection 4 mg  4 mg Intravenous Q6H PRN Nova Pall, DO        oxytocin (PITOCIN) 30 units in 500 mL infusion  1 john-units/min Intravenous Continuous PRN Nova Pall, DO        witch hazel-glycerin (TUCKS) pad   Topical PRN Nova Pall, DO        benzocaine-menthol (DERMOPLAST) 20-0.5 % spray   Topical PRN Nova Pall, DO        butorphanol (STADOL) injection 1 mg  1 mg Intravenous Q2H PRN Nova Pall, DO        penicillin G potassium 5 Million Units in dextrose 5 % 100 mL IVPB (mini-bag)  5 Million Units Intravenous Once Nova Pall, DO        Followed by   Gómez penicillin G potassium IVPB 3 Million Units  3 Million Units Intravenous Q4H Mine Cooper, DO        oxytocin (PITOCIN) 30 units in 500 mL infusion  1 john-units/min Intravenous Continuous Nova Pall, DO           Allergies:  No Known Allergies    Problem List:    Patient Active Problem List   Diagnosis Code    40 not a current smoker          Patient did not smoke on day of surgery. Cardiovascular:Negative CV ROS  Exercise tolerance: good (>4 METS),           Rhythm: regular  Rate: normal           Beta Blocker:  Not on Beta Blocker         Neuro/Psych:   Negative Neuro/Psych ROS              GI/Hepatic/Renal:   (+) morbid obesity          Endo/Other:    (+) blood dyscrasia: anemia:., .                 Abdominal:           Vascular: negative vascular ROS. Anesthesia Plan      general, spinal and epidural     ASA 2     (Discussed labor options with patient and spouse. Questions answered. Patient agrees to epidural when needed.)        Anesthetic plan and risks discussed with patient and spouse. Plan discussed with attending. CBC No results found for: WBC, RBC, HGB, HCT, MCV, RDW, PLT  CMP  No results found for: NA, K, CL, CO2, BUN, CREATININE, GFRAA, AGRATIO, LABGLOM, GLUCOSE, PROT, CALCIUM, BILITOT, ALKPHOS, AST, ALT  BMP  No results found for: NA, K, CL, CO2, BUN, CREATININE, CALCIUM, GFRAA, LABGLOM, GLUCOSE  POCGlucose  No results for input(s): GLUCOSE in the last 72 hours.      Coags  No results found for: PROTIME, INR, APTT    HCG (If Applicable) No results found for: PREGTESTUR, PREGSERUM, HCG, HCGQUANT     ABGs   No results found for: PHART, PO2ART, AWI1VGY, DPA9ZCM, BEART, C3QEKYBW     Type & Screen (If Applicable)  No results found for: [address]  Active Problem List with ICD10 Codes  Patient Active Problem List   Diagnosis Code    40 weeks gestation of pregnancy Z3A.40       KAUSHIK Wilkes CRNA  January 4, 2020  12:08 AM      KAUHSIK Wilkes CRNA   1/4/2020

## 2020-01-05 LAB
FETAL SCREEN: NORMAL
HCT VFR BLD CALC: 31.3 % (ref 34–48)
HEMOGLOBIN: 10.4 G/DL (ref 11.5–15.5)
RHIG LOT NUMBER: NORMAL

## 2020-01-05 PROCEDURE — 85018 HEMOGLOBIN: CPT

## 2020-01-05 PROCEDURE — 96372 THER/PROPH/DIAG INJ SC/IM: CPT

## 2020-01-05 PROCEDURE — 6370000000 HC RX 637 (ALT 250 FOR IP): Performed by: OBSTETRICS & GYNECOLOGY

## 2020-01-05 PROCEDURE — 6360000002 HC RX W HCPCS: Performed by: OBSTETRICS & GYNECOLOGY

## 2020-01-05 PROCEDURE — 36415 COLL VENOUS BLD VENIPUNCTURE: CPT

## 2020-01-05 PROCEDURE — 85461 HEMOGLOBIN FETAL: CPT

## 2020-01-05 PROCEDURE — 1220000000 HC SEMI PRIVATE OB R&B

## 2020-01-05 PROCEDURE — 85014 HEMATOCRIT: CPT

## 2020-01-05 RX ADMIN — DOCUSATE SODIUM 100 MG: 100 CAPSULE, LIQUID FILLED ORAL at 19:45

## 2020-01-05 RX ADMIN — BENZOCAINE AND LEVOMENTHOL: 200; 5 SPRAY TOPICAL at 03:56

## 2020-01-05 RX ADMIN — HUMAN RHO(D) IMMUNE GLOBULIN 300 MCG: 300 INJECTION, SOLUTION INTRAMUSCULAR at 12:38

## 2020-01-05 RX ADMIN — Medication: at 03:56

## 2020-01-05 RX ADMIN — DOCUSATE SODIUM 100 MG: 100 CAPSULE, LIQUID FILLED ORAL at 10:06

## 2020-01-05 RX ADMIN — IBUPROFEN 800 MG: 800 TABLET, FILM COATED ORAL at 17:18

## 2020-01-05 RX ADMIN — IBUPROFEN 800 MG: 800 TABLET, FILM COATED ORAL at 06:38

## 2020-01-05 ASSESSMENT — PAIN SCALES - GENERAL
PAINLEVEL_OUTOF10: 1
PAINLEVEL_OUTOF10: 2
PAINLEVEL_OUTOF10: 0

## 2020-01-05 ASSESSMENT — PAIN DESCRIPTION - RADICULAR PAIN: RADICULAR_PAIN: ABSENT

## 2020-01-05 NOTE — PROGRESS NOTES
Progress Note    SUBJECTIVE:  Pt comfortable  + void   +flatus  decreased vaginal bleeding  + Breastfeeding    OBJECTIVE:    VITALS:  /61   Pulse 72   Temp 98.5 °F (36.9 °C) (Oral)   Resp 16   Ht 5' 2\" (1.575 m)   Wt 212 lb (96.2 kg)   LMP 2019   SpO2 100%   Breastfeeding?  Unknown   BMI 38.78 kg/m²   Physical Exam  Uterus firm,nt  EXT nt    DATA:  Hemoglobin/Hematocrit:    Lab Results   Component Value Date    HGB 10.4 2020    HCT 31.3 2020       ASSESSMENT AND PLAN:    28yo  @ 40 weeks s/p   Routine post partum care  Discharge home tomorrow       Ebenezer Weller  2020NOW@

## 2020-01-05 NOTE — ANESTHESIA POSTPROCEDURE EVALUATION
Department of Anesthesiology  Postprocedure Note    Patient: Jonathan Garcia  MRN: [id number]  YOB: 1990  Date of evaluation: 1/5/2020  Time:  11:43 AM     Procedure Summary     Date:  01/04/20 Room / Location:      Anesthesia Start:  0555 Anesthesia Stop:  6639    Procedure:  Labor Analgesia Diagnosis:      Scheduled Providers:   Responsible Provider:  Jacinda Barry MD    Anesthesia Type:  general, spinal, epidural ASA Status:  2          Anesthesia Type: general, spinal, epidural    Shaina Phase I:      Shaina Phase II:      Last vitals: Reviewed and per EMR flowsheets.        Anesthesia Post Evaluation    Patient location during evaluation: bedside  Patient participation: complete - patient participated  Level of consciousness: awake and alert  Pain score: 0  Airway patency: patent  Nausea & Vomiting: no nausea and no vomiting  Complications: no  Cardiovascular status: hemodynamically stable  Respiratory status: acceptable  Hydration status: stable

## 2020-01-06 VITALS
HEIGHT: 62 IN | RESPIRATION RATE: 16 BRPM | OXYGEN SATURATION: 100 % | WEIGHT: 212 LBS | SYSTOLIC BLOOD PRESSURE: 138 MMHG | HEART RATE: 75 BPM | DIASTOLIC BLOOD PRESSURE: 70 MMHG | TEMPERATURE: 97.6 F | BODY MASS INDEX: 39.01 KG/M2

## 2020-01-06 PROCEDURE — 6370000000 HC RX 637 (ALT 250 FOR IP): Performed by: OBSTETRICS & GYNECOLOGY

## 2020-01-06 RX ADMIN — IBUPROFEN 800 MG: 800 TABLET, FILM COATED ORAL at 03:57

## 2020-01-06 RX ADMIN — DOCUSATE SODIUM 100 MG: 100 CAPSULE, LIQUID FILLED ORAL at 09:38

## 2020-01-06 ASSESSMENT — PAIN DESCRIPTION - RADICULAR PAIN: RADICULAR_PAIN: ABSENT

## 2020-01-06 ASSESSMENT — PAIN SCALES - GENERAL: PAINLEVEL_OUTOF10: 1

## 2020-01-06 NOTE — LACTATION NOTE
Mom reports baby is having a hard time latching, will try a #24 shield at home, pumping and formula feeding at this time. Encouraged frequent feeds to establish milk supply. Reviewed benefits and safety of skin to skin. Inst on adequate I/O and importance of keeping track of diapers at home. Instructed on signs of dehydration such as infant refusing to feed, decreased wet diapers and infant becoming listless and notify provider if these occur. Latch and Learn Information given as well as lactation office # if follow-up needed. Encouraged to call with any concerns.

## 2020-02-02 ENCOUNTER — HOSPITAL ENCOUNTER (EMERGENCY)
Dept: HOSPITAL 83 - ED | Age: 30
Discharge: HOME | End: 2020-02-02
Payer: COMMERCIAL

## 2020-02-02 VITALS — BODY MASS INDEX: 32.76 KG/M2 | WEIGHT: 178 LBS | HEIGHT: 61.97 IN
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
## 2020-02-02 DIAGNOSIS — S70.362A: Primary | ICD-10-CM
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
## 2020-02-02 DIAGNOSIS — Z91.040: ICD-10-CM
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
## 2020-06-25 ENCOUNTER — HOSPITAL ENCOUNTER (OUTPATIENT)
Age: 30
Discharge: HOME | End: 2020-06-27
Payer: MEDICARE

## 2020-06-25 PROCEDURE — 87147 CULTURE TYPE IMMUNOLOGIC: CPT

## 2020-06-25 PROCEDURE — 87624 HPV HI-RISK TYP POOLED RSLT: CPT

## 2020-06-25 PROCEDURE — 88175 CYTOPATH C/V AUTO FLUID REDO: CPT

## 2020-06-25 PROCEDURE — 87070 CULTURE OTHR SPECIMN AEROBIC: CPT

## 2020-06-29 LAB
GENITAL CULTURE, ROUTINE: ABNORMAL
GENITAL CULTURE, ROUTINE: ABNORMAL
ORGANISM: ABNORMAL

## 2020-06-30 LAB
HPV SAMPLE: NORMAL
HPV TYPE 16: NOT DETECTED
HPV TYPE 18: NOT DETECTED
HPV, HIGH RISK OTHER: NOT DETECTED
INTERPRETATION: NORMAL
SOURCE: NORMAL

## 2020-07-10 ENCOUNTER — HOSPITAL ENCOUNTER (EMERGENCY)
Dept: HOSPITAL 83 - ED | Age: 30
Discharge: HOME | End: 2020-07-10
Payer: COMMERCIAL

## 2020-07-10 VITALS — WEIGHT: 178 LBS | BODY MASS INDEX: 32.76 KG/M2 | HEIGHT: 61.97 IN

## 2020-07-10 DIAGNOSIS — G43.909: ICD-10-CM

## 2020-07-10 DIAGNOSIS — L03.90: Primary | ICD-10-CM

## 2020-07-10 LAB
ALBUMIN SERPL-MCNC: 3.5 GM/DL (ref 3.1–4.5)
ALP SERPL-CCNC: 61 U/L (ref 45–117)
ALT SERPL W P-5'-P-CCNC: 25 U/L (ref 12–78)
AST SERPL-CCNC: 21 IU/L (ref 3–35)
BASOPHILS # BLD AUTO: 0 10*3/UL (ref 0–0.1)
BASOPHILS NFR BLD AUTO: 0.2 % (ref 0–1)
BUN SERPL-MCNC: 15 MG/DL (ref 7–24)
CHLORIDE SERPL-SCNC: 104 MMOL/L (ref 98–107)
CREAT SERPL-MCNC: 0.87 MG/DL (ref 0.55–1.02)
EOSINOPHIL # BLD AUTO: 0 % (ref 1–4)
EOSINOPHIL # BLD AUTO: 0 10*3/UL (ref 0–0.4)
ERYTHROCYTE [DISTWIDTH] IN BLOOD BY AUTOMATED COUNT: 11.8 % (ref 0–14.5)
HCT VFR BLD AUTO: 36.4 % (ref 37–47)
LYMPHOCYTES # BLD AUTO: 0.8 10*3/UL (ref 1.3–4.4)
LYMPHOCYTES NFR BLD AUTO: 15.3 % (ref 27–41)
MCH RBC QN AUTO: 29.6 PG (ref 27–31)
MCHC RBC AUTO-ENTMCNC: 34.3 G/DL (ref 33–37)
MCV RBC AUTO: 86.1 FL (ref 81–99)
MONOCYTES # BLD AUTO: 0.3 10*3/UL (ref 0.1–1)
MONOCYTES NFR BLD MANUAL: 5.8 % (ref 3–9)
NEUT #: 4 10*3/UL (ref 2.3–7.9)
NEUT %: 78.5 % (ref 47–73)
NRBC BLD QL AUTO: 0 10*3/UL (ref 0–0)
PLATELET # BLD AUTO: 213 10*3/UL (ref 130–400)
PMV BLD AUTO: 9.5 FL (ref 9.6–12.3)
POTASSIUM SERPL-SCNC: 3.5 MMOL/L (ref 3.5–5.1)
PROT SERPL-MCNC: 6.9 GM/DL (ref 6.4–8.2)
RBC # BLD AUTO: 4.23 10*6/UL (ref 4.1–5.1)
SODIUM SERPL-SCNC: 135 MMOL/L (ref 136–145)
WBC NRBC COR # BLD AUTO: 5 10*3/UL (ref 4.8–10.8)

## 2020-07-20 ENCOUNTER — HOSPITAL ENCOUNTER (EMERGENCY)
Dept: HOSPITAL 83 - ED | Age: 30
Discharge: HOME | End: 2020-07-20
Payer: COMMERCIAL

## 2020-07-20 VITALS — HEIGHT: 61.97 IN | WEIGHT: 178 LBS | BODY MASS INDEX: 32.76 KG/M2

## 2020-07-20 DIAGNOSIS — L29.9: ICD-10-CM

## 2020-07-20 DIAGNOSIS — L53.9: ICD-10-CM

## 2020-07-20 DIAGNOSIS — R21: Primary | ICD-10-CM

## 2020-07-20 LAB
ALBUMIN SERPL-MCNC: 3.8 GM/DL (ref 3.1–4.5)
ALP SERPL-CCNC: 81 U/L (ref 45–117)
ALT SERPL W P-5'-P-CCNC: 34 U/L (ref 12–78)
AST SERPL-CCNC: 11 IU/L (ref 3–35)
BASOPHILS # BLD AUTO: 0 10*3/UL (ref 0–0.1)
BASOPHILS NFR BLD AUTO: 0.1 % (ref 0–1)
BUN SERPL-MCNC: 19 MG/DL (ref 7–24)
CHLORIDE SERPL-SCNC: 106 MMOL/L (ref 98–107)
CREAT SERPL-MCNC: 0.73 MG/DL (ref 0.55–1.02)
EOSINOPHIL # BLD AUTO: 0 10*3/UL (ref 0–0.4)
EOSINOPHIL # BLD AUTO: 0.3 % (ref 1–4)
ERYTHROCYTE [DISTWIDTH] IN BLOOD BY AUTOMATED COUNT: 12.3 % (ref 0–14.5)
HCT VFR BLD AUTO: 39.7 % (ref 37–47)
LYMPHOCYTES # BLD AUTO: 1.1 10*3/UL (ref 1.3–4.4)
LYMPHOCYTES NFR BLD AUTO: 15 % (ref 27–41)
MCH RBC QN AUTO: 29 PG (ref 27–31)
MCHC RBC AUTO-ENTMCNC: 32.7 G/DL (ref 33–37)
MCV RBC AUTO: 88.4 FL (ref 81–99)
MONOCYTES # BLD AUTO: 0.4 10*3/UL (ref 0.1–1)
MONOCYTES NFR BLD MANUAL: 5 % (ref 3–9)
NEUT #: 5.7 10*3/UL (ref 2.3–7.9)
NEUT %: 78.9 % (ref 47–73)
NRBC BLD QL AUTO: 0 % (ref 0–0)
PLATELET # BLD AUTO: 388 10*3/UL (ref 130–400)
PMV BLD AUTO: 9.1 FL (ref 9.6–12.3)
POTASSIUM SERPL-SCNC: 3.8 MMOL/L (ref 3.5–5.1)
PROT SERPL-MCNC: 7.7 GM/DL (ref 6.4–8.2)
RBC # BLD AUTO: 4.49 10*6/UL (ref 4.1–5.1)
SODIUM SERPL-SCNC: 138 MMOL/L (ref 136–145)
WBC NRBC COR # BLD AUTO: 7.3 10*3/UL (ref 4.8–10.8)

## 2024-01-23 ENCOUNTER — OFFICE VISIT (OUTPATIENT)
Dept: ENT CLINIC | Age: 34
End: 2024-01-23
Payer: COMMERCIAL

## 2024-01-23 VITALS
OXYGEN SATURATION: 96 % | HEART RATE: 68 BPM | SYSTOLIC BLOOD PRESSURE: 118 MMHG | WEIGHT: 194 LBS | HEIGHT: 62 IN | BODY MASS INDEX: 35.7 KG/M2 | TEMPERATURE: 98.3 F | DIASTOLIC BLOOD PRESSURE: 76 MMHG

## 2024-01-23 DIAGNOSIS — K21.9 GASTROESOPHAGEAL REFLUX DISEASE WITHOUT ESOPHAGITIS: ICD-10-CM

## 2024-01-23 DIAGNOSIS — R49.0 HOARSENESS OF VOICE: Primary | ICD-10-CM

## 2024-01-23 DIAGNOSIS — J30.1 SEASONAL ALLERGIC RHINITIS DUE TO POLLEN: ICD-10-CM

## 2024-01-23 PROCEDURE — G8419 CALC BMI OUT NRM PARAM NOF/U: HCPCS | Performed by: OTOLARYNGOLOGY

## 2024-01-23 PROCEDURE — 99204 OFFICE O/P NEW MOD 45 MIN: CPT | Performed by: OTOLARYNGOLOGY

## 2024-01-23 PROCEDURE — 1036F TOBACCO NON-USER: CPT | Performed by: OTOLARYNGOLOGY

## 2024-01-23 PROCEDURE — G8427 DOCREV CUR MEDS BY ELIG CLIN: HCPCS | Performed by: OTOLARYNGOLOGY

## 2024-01-23 PROCEDURE — G8484 FLU IMMUNIZE NO ADMIN: HCPCS | Performed by: OTOLARYNGOLOGY

## 2024-01-23 RX ORDER — FLUTICASONE PROPIONATE 50 MCG
2 SPRAY, SUSPENSION (ML) NASAL DAILY
Qty: 16 G | Refills: 3 | Status: SHIPPED | OUTPATIENT
Start: 2024-01-23

## 2024-01-23 ASSESSMENT — ENCOUNTER SYMPTOMS
SINUS PRESSURE: 1
RHINORRHEA: 1
GASTROINTESTINAL NEGATIVE: 1
COLOR CHANGE: 0
RESPIRATORY NEGATIVE: 1
SHORTNESS OF BREATH: 0
ABDOMINAL PAIN: 0
EYES NEGATIVE: 1

## 2024-01-23 NOTE — PROGRESS NOTES
control/protection: Condom     No Known Allergies      Review of Systems   Constitutional: Negative.  Negative for fever.   HENT:  Positive for congestion, postnasal drip, rhinorrhea, sinus pressure and sneezing.    Eyes: Negative.  Negative for visual disturbance.   Respiratory: Negative.  Negative for shortness of breath.    Cardiovascular: Negative.  Negative for chest pain.   Gastrointestinal: Negative.  Negative for abdominal pain.   Genitourinary: Negative.    Musculoskeletal: Negative.    Skin: Negative.  Negative for color change.   Allergic/Immunologic: Positive for environmental allergies.   Neurological: Negative.    Psychiatric/Behavioral: Negative.  Negative for behavioral problems and hallucinations.    All other systems reviewed and are negative.              Objective:   Physical Exam  Vitals and nursing note reviewed.   Constitutional:       Appearance: She is well-developed.   HENT:      Head: Normocephalic and atraumatic.      Right Ear: Hearing, tympanic membrane, ear canal and external ear normal.      Left Ear: Hearing, tympanic membrane, ear canal and external ear normal.      Nose: Mucosal edema and rhinorrhea present.      Mouth/Throat:      Lips: Pink.      Mouth: Mucous membranes are moist.      Pharynx: Oropharynx is clear. Uvula midline.      Tonsils: 2+ on the right. 2+ on the left.        Comments: Geographic tongue.   Eyes:      Conjunctiva/sclera: Conjunctivae normal.      Pupils: Pupils are equal, round, and reactive to light.   Cardiovascular:      Rate and Rhythm: Normal rate and regular rhythm.      Heart sounds: Normal heart sounds.   Pulmonary:      Effort: Pulmonary effort is normal.      Breath sounds: Normal breath sounds.   Abdominal:      General: Bowel sounds are normal.      Palpations: Abdomen is soft.   Musculoskeletal:      Cervical back: Normal range of motion and neck supple.   Skin:     General: Skin is warm and dry.   Neurological:      Mental Status: She is alert

## 2024-05-28 NOTE — FLOWSHEET NOTE
Patient reports she had the flu and Tdap. 

## 2024-06-04 ENCOUNTER — OFFICE VISIT (OUTPATIENT)
Dept: ENT CLINIC | Age: 34
End: 2024-06-04
Payer: COMMERCIAL

## 2024-06-04 ENCOUNTER — HOSPITAL ENCOUNTER (EMERGENCY)
Age: 34
Discharge: HOME | End: 2024-06-04
Attending: EMERGENCY MEDICINE
Payer: COMMERCIAL

## 2024-06-04 ENCOUNTER — TELEPHONE (OUTPATIENT)
Dept: ENT CLINIC | Age: 34
End: 2024-06-04

## 2024-06-04 VITALS
HEIGHT: 62 IN | HEART RATE: 54 BPM | SYSTOLIC BLOOD PRESSURE: 123 MMHG | OXYGEN SATURATION: 98 % | TEMPERATURE: 97.7 F | RESPIRATION RATE: 20 BRPM | BODY MASS INDEX: 36.07 KG/M2 | WEIGHT: 196 LBS | DIASTOLIC BLOOD PRESSURE: 65 MMHG

## 2024-06-04 VITALS
HEART RATE: 48 BPM | DIASTOLIC BLOOD PRESSURE: 80 MMHG | WEIGHT: 196 LBS | HEIGHT: 62 IN | BODY MASS INDEX: 36.07 KG/M2 | OXYGEN SATURATION: 97 % | SYSTOLIC BLOOD PRESSURE: 110 MMHG

## 2024-06-04 DIAGNOSIS — J38.3 LESION OF TRUE VOCAL CORD: ICD-10-CM

## 2024-06-04 DIAGNOSIS — R49.0 HOARSENESS: ICD-10-CM

## 2024-06-04 DIAGNOSIS — R55 VASOVAGAL SYNCOPE: Primary | ICD-10-CM

## 2024-06-04 DIAGNOSIS — R55 SYNCOPE, VASOVAGAL: Primary | ICD-10-CM

## 2024-06-04 LAB
EKG ATRIAL RATE: 48 BPM
EKG P AXIS: 40 DEGREES
EKG P-R INTERVAL: 190 MS
EKG Q-T INTERVAL: 438 MS
EKG QRS DURATION: 98 MS
EKG QTC CALCULATION (BAZETT): 391 MS
EKG R AXIS: 51 DEGREES
EKG T AXIS: 23 DEGREES
EKG VENTRICULAR RATE: 48 BPM

## 2024-06-04 PROCEDURE — 93005 ELECTROCARDIOGRAM TRACING: CPT | Performed by: EMERGENCY MEDICINE

## 2024-06-04 PROCEDURE — 1036F TOBACCO NON-USER: CPT

## 2024-06-04 PROCEDURE — 93010 ELECTROCARDIOGRAM REPORT: CPT | Performed by: INTERNAL MEDICINE

## 2024-06-04 PROCEDURE — 31575 DIAGNOSTIC LARYNGOSCOPY: CPT

## 2024-06-04 PROCEDURE — 99283 EMERGENCY DEPT VISIT LOW MDM: CPT

## 2024-06-04 PROCEDURE — 99214 OFFICE O/P EST MOD 30 MIN: CPT | Performed by: OTOLARYNGOLOGY

## 2024-06-04 PROCEDURE — G8417 CALC BMI ABV UP PARAM F/U: HCPCS

## 2024-06-04 PROCEDURE — G8427 DOCREV CUR MEDS BY ELIG CLIN: HCPCS

## 2024-06-04 ASSESSMENT — ENCOUNTER SYMPTOMS
SHORTNESS OF BREATH: 0
EYE PAIN: 0
NAUSEA: 0
WHEEZING: 0
EYE REDNESS: 0
COUGH: 0
BACK PAIN: 0
ABDOMINAL DISTENTION: 0
SORE THROAT: 0
VOMITING: 0
EYE DISCHARGE: 0
SINUS PRESSURE: 0
DIARRHEA: 0

## 2024-06-04 ASSESSMENT — LIFESTYLE VARIABLES
HOW MANY STANDARD DRINKS CONTAINING ALCOHOL DO YOU HAVE ON A TYPICAL DAY: PATIENT DOES NOT DRINK
HOW OFTEN DO YOU HAVE A DRINK CONTAINING ALCOHOL: NEVER

## 2024-06-04 ASSESSMENT — PAIN - FUNCTIONAL ASSESSMENT: PAIN_FUNCTIONAL_ASSESSMENT: NONE - DENIES PAIN

## 2024-06-04 NOTE — PROGRESS NOTES
was in the 40's. Oxygen saturation in the mid 90's. She was unable to be aroused for ~10 minutes. Once awake and responsive, patient was alert and oriented x 3 and following commands. Decision was made to call EMS and send patient to ED for further workup.     Patient is to follow up in the next 2 weeks to discuss scope results.       RX given today:  Kathy Alegria DO,  Resident Physician, PGY-2  Department of Otolaryngology, [facility]  1990    I have discussed the case, including pertinent history and exam findings with the resident. I have seen and examined the patient and the key elements of the encounter have been performed by me. I agree with the assessment, plan and orders as documented by the  resident              Remainder of medical problems as per  resident note.    Patient seen and examined. Agree with above exam, assessment and plan.      Electronically signed by Jemal Jamison DO on 6/14/24 at 9:42 AM EDT

## 2024-06-04 NOTE — ED PROVIDER NOTES
33-year-old female presents to the emergency department after a syncopal episode at our local ENT office.  Patient's doctor Dr. Rubens Mirza notified us that while finishing a nasal laryngoscope performed by the resident patient became lightheaded dizzy and actually passed out.  Patient's blood pressure was very low and her heart rate was dropping into the 40s.  Patient states since then she is slowly felt more more herself.  She reports no chest pain or shortness of breath she is on no current birth control she is having no abdominal pain or other complaints at this time    The history is provided by the patient.   Loss of Consciousness  Episode history:  Single  Most recent episode:  Today  Duration:  2 minutes  Timing:  Intermittent  Progression:  Resolved  Chronicity:  New  Relieved by:  Nothing  Worsened by:  Nothing  Ineffective treatments:  None tried  Associated symptoms: no chest pain, no fever, no headaches, no nausea, no shortness of breath, no vomiting and no weakness         Review of Systems   Constitutional:  Negative for chills and fever.   HENT:  Negative for ear pain, sinus pressure and sore throat.    Eyes:  Negative for pain, discharge and redness.   Respiratory:  Negative for cough, shortness of breath and wheezing.    Cardiovascular:  Positive for syncope. Negative for chest pain.   Gastrointestinal:  Negative for abdominal distention, diarrhea, nausea and vomiting.   Genitourinary:  Negative for dysuria and frequency.   Musculoskeletal:  Negative for arthralgias and back pain.   Skin:  Negative for rash and wound.   Neurological:  Negative for weakness and headaches.   Hematological:  Negative for adenopathy.   All other systems reviewed and are negative.       Physical Exam  HENT:      Head: Normocephalic and atraumatic.      Nose: Nose normal.   Eyes:      Extraocular Movements: Extraocular movements intact.      Pupils: Pupils are equal, round, and reactive to light.   Cardiovascular:

## 2024-06-04 NOTE — TELEPHONE ENCOUNTER
During patient's appointment today, Dr. Myers scoped patient to check vocal cords. Dr. Myers poked head out of exam room asking for help due to patient having a possible vasovagal episode. I went into exam room to assist Dr. Myers, Dr. Myers was massaging patient's chest at an attempt to wake her up. Dr. Jamison and Dr. Fried came to assist with patient. Patient was pale, sweating, unresponsive. Many unsuccessful attempts made asking patient to open eyes and wake up. Dr. Jamison lightly shook patient's shoulders and touched patient's chin at attempts to wake patient up, unsuccessful. Patient was unresponsive for at least a few minutes. Patient's 4 year old son was with patient, we had son Federica go with Catie out of the exam room. Wet wash cloth applied to patient's forehead. With help of Dr. Myers, I got the blood pressure cuff on patient. Machine would not read blood pressure but pulse was 48, O2 97%. I got manual blood pressure of 88/46, pulse still 48. Patient began to wake up, eyes kept rolling to back of head. Makayla called 911 as instructed by Dr. Jamison. Once patient finally came to, Dr. Jamison asked patient if she knew where she was - patient responded \"ENT office\". We laid patient back and got her a glass of water. Patient quiet, pale, clammy, but is responsive. 3 fire fighters came and evaluated patient. They got her blood sugar and blood pressure manually - blood pressure read 110/80 and pulse got up to 70. Ambulance came and took patient to ER, patient's son went with her.    Electronically signed by Sussy Hurtado MA on 6/4/24 at 2:12 PM EDT

## 2024-06-07 ENCOUNTER — OFFICE VISIT (OUTPATIENT)
Dept: ENT CLINIC | Age: 34
End: 2024-06-07
Payer: COMMERCIAL

## 2024-06-07 VITALS
HEART RATE: 55 BPM | WEIGHT: 196 LBS | SYSTOLIC BLOOD PRESSURE: 117 MMHG | HEIGHT: 62 IN | BODY MASS INDEX: 36.07 KG/M2 | DIASTOLIC BLOOD PRESSURE: 68 MMHG | TEMPERATURE: 98.2 F | OXYGEN SATURATION: 98 %

## 2024-06-07 DIAGNOSIS — K21.9 GASTROESOPHAGEAL REFLUX DISEASE WITHOUT ESOPHAGITIS: ICD-10-CM

## 2024-06-07 DIAGNOSIS — J38.3 LESION OF VOCAL FOLD: Primary | ICD-10-CM

## 2024-06-07 DIAGNOSIS — R49.0 HOARSENESS: ICD-10-CM

## 2024-06-07 PROCEDURE — 99214 OFFICE O/P EST MOD 30 MIN: CPT | Performed by: OTOLARYNGOLOGY

## 2024-06-07 PROCEDURE — G8427 DOCREV CUR MEDS BY ELIG CLIN: HCPCS | Performed by: OTOLARYNGOLOGY

## 2024-06-07 PROCEDURE — 1036F TOBACCO NON-USER: CPT | Performed by: OTOLARYNGOLOGY

## 2024-06-07 PROCEDURE — G8417 CALC BMI ABV UP PARAM F/U: HCPCS | Performed by: OTOLARYNGOLOGY

## 2024-06-07 PROCEDURE — 31575 DIAGNOSTIC LARYNGOSCOPY: CPT | Performed by: OTOLARYNGOLOGY

## 2024-06-07 NOTE — PROGRESS NOTES
Subjective:     Patient ID:  Sara Hastings is a 33 y.o. female.    HPI:    Hoarseness  Patient presents with hoarseness.The patient describes moderate episodes of hoarseness which are unchanged over time.  The episodes began 2 year(s) ago.      Symptoms of gastroesophageal reflux is not noted.   Tx:      Symptoms of allergic rhinitis is not noted.  Tx: Claritin      Trauma/Surgeries in the chest or neck? no  Type:     Previous prolonged intubation: no  Time:   ago.      Pt had covid 2021 and has been hoarse since then.  Pt has been on steroids and Abx in the past with no relief.       Pt states it gets worse with seasonal changes.     6/4/24: Patient seen today for follow up of hoarseness. Flonase did improve her mucous but her voice has remained hoarse. Denies any trouble swallowing or weight loss. Denies any smoking history.     6/7/24: Patient seen today to discuss scope results. Unable to discuss at prior visit due to syncopal episode. Denies any changes in her symptoms.      Social History     Tobacco Use   Smoking Status Never   Smokeless Tobacco Never     Social History     Socioeconomic History    Marital status: Single     Spouse name: None    Number of children: None    Years of education: None    Highest education level: None   Tobacco Use    Smoking status: Never    Smokeless tobacco: Never   Vaping Use    Vaping Use: Never used   Substance and Sexual Activity    Alcohol use: Not Currently    Drug use: Never    Sexual activity: Yes     Partners: Male     Birth control/protection: Condom           History reviewed. No pertinent past medical history.  Past Surgical History:   Procedure Laterality Date    WISDOM TOOTH EXTRACTION       Family History   Problem Relation Age of Onset    Seizures Mother     Unknown Father     Diabetes Maternal Grandmother     COPD Maternal Grandmother     Hypertension Maternal Grandfather      Social History     Socioeconomic History    Marital status: Single     Spouse name:

## 2024-06-07 NOTE — PATIENT INSTRUCTIONS
Thank you for choosing our [facility]. We are committed to your medical treatment and  care. If you need to reschedule or cancel your surgery or follow up  appointment, please call the surgery scheduler at [phone].    INSTRUCTIONS FOR SURGERY Micro Laryngoscopy with Biopsy     Nothing to eat or drink after midnight the night before surgery unless surgery is at [facility] or otherwise instructed by the hospital.    DO NOT TAKE ANY ASPIRIN PRODUCTS 7 days prior to surgery-unless required by your cardiologist or primary care physician. Tylenol only. No Advil, Motrin, Aleve, or Ibuprofen    Any illegal drugs in your system (including Marijuana even if legally prescribed) will result in your surgery being cancelled. Please be sure to check with our office or the hospital on time frame for the drugs to be out of your system.    Should your insurance change at any time you must contact our office. Failure to do so may result in your surgery being rescheduled.    If you need paperwork filled out for work, you must give the office 2 weeks to complete and submit the forms.      O [facility], [address] [place] [address] will call you a couple days prior to your surgery and give you further instructions, if any questions call them at [phone]

## 2024-06-10 ENCOUNTER — TELEPHONE (OUTPATIENT)
Dept: ENT CLINIC | Age: 34
End: 2024-06-10

## 2024-06-10 ENCOUNTER — PREP FOR PROCEDURE (OUTPATIENT)
Dept: ENT CLINIC | Age: 34
End: 2024-06-10

## 2024-06-10 DIAGNOSIS — J38.3 LESION OF TRUE VOCAL CORD: ICD-10-CM

## 2024-06-10 NOTE — TELEPHONE ENCOUNTER
Prior Authorization Form:      DEMOGRAPHICS:                     Patient Name:  Sara Hastings  Patient :  1990            Insurance:  Payor: [facility] / Plan: [facility] MEDICAID / Product Type: *No Product type* /   Insurance ID Number:    Payer/Plan Subscr  Sex Relation Sub. Ins. ID Effective Group Num   1. SARA KAUR 1990 Female Self [id number] 10/1/23 [employer]                                   [address]   2. SRAA KAUR 1990 Female Self [id number] 10/1/23 [employer]                                   [address]         DIAGNOSIS & PROCEDURE:                       Procedure/Operation: Direct laryngoscopy w/ biopsy           CPT Code: 15842,32003    Diagnosis:  left true vocal cord lesion    ICD10 Code: J38.3    Location:  [facility]    Surgeon:  arianna    SCHEDULING INFORMATION:                          Date: 24    Time: n/a              Anesthesia:  General                                                       Status:  Outpatient        Special Comments:  include all chart notes       Electronically signed by Tommy Thomas MA on 6/10/2024 at 1:53 PM

## 2024-06-21 RX ORDER — FLUTICASONE PROPIONATE 50 MCG
2 SPRAY, SUSPENSION (ML) NASAL DAILY
Qty: 16 G | Refills: 3 | Status: SHIPPED | OUTPATIENT
Start: 2024-06-21

## 2024-06-25 NOTE — PROGRESS NOTES
[facility] PRE-ADMISSION TESTING INSTRUCTIONS    The Preadmission Testing patient is instructed accordingly using the following criteria (check applicable):    ARRIVAL INSTRUCTIONS:  [x] Parking the day of Surgery is located in the Main Entrance lot.  Upon entering the door, make an immediate right to the surgery reception desk    [x] Bring photo ID and insurance card    [] Bring in a copy of Living will or Durable Power of  papers.    [x] Please be sure to arrange for responsible adult to provide transportation to and from the hospital    [x] Please arrange for responsible adult to be with you for the 24 hour period post procedure due to having anesthesia    [x] If you awake am of surgery not feeling well or have temperature >100 please call [phone]    GENERAL INSTRUCTIONS:    [x] May have clear liquids until 4 hours prior to surgery. Examples include water, fruit juices (no pulp), jello, popsicles, black coffee or tea, beef or chicken broth.             No gum, candy or mints.    [x] You may brush your teeth, but do not swallow any water    [] Take medications as instructed with 1-2 oz of water    [] Stop herbal supplements and vitamins 5 days prior to procedure    [] Follow preop dosing of blood thinners per physician instructions    [] Take 1/2 dose of evening insulin, but no insulin after midnight    [] No oral diabetic medications after midnight    [] If diabetic and have low blood sugar or feel symptomatic, take 1-2oz apple juice only    [] Bring inhalers day of surgery    [] Bring C-PAP/ Bi-Pap day of surgery    [x] Bring urine specimen day of surgery    [x] Shower or bath with soap, lather and rinse well, AM of Surgery, no lotion, powders or creams to surgical site    [] Follow bowel prep as instructed per surgeon    [x] No tobacco products within 24 hours of surgery     [x] No alcohol or illegal drug use within 24 hours of surgery.    [x] Jewelry, body piercing's,

## 2024-06-28 ENCOUNTER — ANESTHESIA EVENT (OUTPATIENT)
Dept: OPERATING ROOM | Age: 34
End: 2024-06-28
Payer: COMMERCIAL

## 2024-06-30 NOTE — H&P
Subjective:      Patient ID:  Sara Hastings is a 33 y.o. female.     HPI:     Hoarseness  Patient presents with hoarseness.The patient describes moderate episodes of hoarseness which are unchanged over time.  The episodes began 2 year(s) ago.       Symptoms of gastroesophageal reflux is not noted.   Tx:       Symptoms of allergic rhinitis is not noted.  Tx: Claritin        Trauma/Surgeries in the chest or neck? no  Type:      Previous prolonged intubation: no  Time:   ago.        Pt had covid 2021 and has been hoarse since then.  Pt has been on steroids and Abx in the past with no relief.         Pt states it gets worse with seasonal changes.      6/4/24: Patient seen today for follow up of hoarseness. Flonase did improve her mucous but her voice has remained hoarse. Denies any trouble swallowing or weight loss. Denies any smoking history.      6/7/24: Patient seen today to discuss scope results. Unable to discuss at prior visit due to syncopal episode. Denies any changes in her symptoms.      Social History          Tobacco Use   Smoking Status Never   Smokeless Tobacco Never      Social History   Social History            Socioeconomic History    Marital status: Single       Spouse name: None    Number of children: None    Years of education: None    Highest education level: None   Tobacco Use    Smoking status: Never    Smokeless tobacco: Never   Vaping Use    Vaping Use: Never used   Substance and Sexual Activity    Alcohol use: Not Currently    Drug use: Never    Sexual activity: Yes       Partners: Male       Birth control/protection: Condom                  Past Medical History   History reviewed. No pertinent past medical history.     Past Surgical History         Past Surgical History:   Procedure Laterality Date    WISDOM TOOTH EXTRACTION             Family History         Family History   Problem Relation Age of Onset    Seizures Mother      Unknown Father      Diabetes Maternal Grandmother      COPD  Maternal Grandmother      Hypertension Maternal Grandfather           Social History   Social History            Socioeconomic History    Marital status: Single       Spouse name: None    Number of children: None    Years of education: None    Highest education level: None   Tobacco Use    Smoking status: Never    Smokeless tobacco: Never   Vaping Use    Vaping Use: Never used   Substance and Sexual Activity    Alcohol use: Not Currently    Drug use: Never    Sexual activity: Yes       Partners: Male       Birth control/protection: Condom         No Known Allergies        Review of Systems   Constitutional: Negative.  Negative for fever.   HENT:  Positive for sinus pressure, sneezing and voice change. Negative for congestion, postnasal drip and rhinorrhea.    Eyes: Negative.  Negative for visual disturbance.   Respiratory: Negative.  Negative for shortness of breath.    Cardiovascular: Negative.  Negative for chest pain.   Gastrointestinal: Negative.  Negative for abdominal pain.   Genitourinary: Negative.    Musculoskeletal: Negative.    Skin: Negative.  Negative for color change.   Allergic/Immunologic: Positive for environmental allergies.   Neurological: Negative.    Psychiatric/Behavioral: Negative.  Negative for behavioral problems and hallucinations.    All other systems reviewed and are negative.                    Objective:   Physical Exam  Vitals and nursing note reviewed.   Constitutional:       Appearance: She is well-developed.   HENT:      Head: Normocephalic and atraumatic.      Right Ear: Hearing, tympanic membrane, ear canal and external ear normal.      Left Ear: Hearing, tympanic membrane, ear canal and external ear normal.      Nose: Mucosal edema and rhinorrhea present.      Mouth/Throat:      Lips: Pink.      Mouth: Mucous membranes are moist.      Pharynx: Oropharynx is clear. Uvula midline.      Tonsils: 2+ on the right. 2+ on the left.        Comments: Geographic tongue.   Eyes:

## 2024-07-01 ENCOUNTER — HOSPITAL ENCOUNTER (OUTPATIENT)
Age: 34
Setting detail: OUTPATIENT SURGERY
Discharge: HOME | End: 2024-07-01
Attending: OTOLARYNGOLOGY | Admitting: OTOLARYNGOLOGY
Payer: COMMERCIAL

## 2024-07-01 ENCOUNTER — ANESTHESIA (OUTPATIENT)
Dept: OPERATING ROOM | Age: 34
End: 2024-07-01
Payer: COMMERCIAL

## 2024-07-01 VITALS
RESPIRATION RATE: 16 BRPM | DIASTOLIC BLOOD PRESSURE: 66 MMHG | HEIGHT: 62 IN | WEIGHT: 196 LBS | HEART RATE: 56 BPM | OXYGEN SATURATION: 95 % | TEMPERATURE: 97.2 F | BODY MASS INDEX: 36.07 KG/M2 | SYSTOLIC BLOOD PRESSURE: 118 MMHG

## 2024-07-01 DIAGNOSIS — J38.3 LESION OF TRUE VOCAL CORD: Primary | ICD-10-CM

## 2024-07-01 LAB
HCG, URINE, POC: NEGATIVE
Lab: NORMAL
NEGATIVE QC PASS/FAIL: NORMAL
POSITIVE QC PASS/FAIL: NORMAL

## 2024-07-01 PROCEDURE — 3700000000 HC ANESTHESIA ATTENDED CARE: Performed by: OTOLARYNGOLOGY

## 2024-07-01 PROCEDURE — 3700000001 HC ADD 15 MINUTES (ANESTHESIA): Performed by: OTOLARYNGOLOGY

## 2024-07-01 PROCEDURE — 7100000011 HC PHASE II RECOVERY - ADDTL 15 MIN: Performed by: OTOLARYNGOLOGY

## 2024-07-01 PROCEDURE — 6360000002 HC RX W HCPCS: Performed by: STUDENT IN AN ORGANIZED HEALTH CARE EDUCATION/TRAINING PROGRAM

## 2024-07-01 PROCEDURE — 88305 TISSUE EXAM BY PATHOLOGIST: CPT

## 2024-07-01 PROCEDURE — 2709999900 HC NON-CHARGEABLE SUPPLY: Performed by: OTOLARYNGOLOGY

## 2024-07-01 PROCEDURE — 3600000002 HC SURGERY LEVEL 2 BASE: Performed by: OTOLARYNGOLOGY

## 2024-07-01 PROCEDURE — 2500000003 HC RX 250 WO HCPCS

## 2024-07-01 PROCEDURE — 7100000001 HC PACU RECOVERY - ADDTL 15 MIN: Performed by: OTOLARYNGOLOGY

## 2024-07-01 PROCEDURE — 2580000003 HC RX 258

## 2024-07-01 PROCEDURE — 7100000000 HC PACU RECOVERY - FIRST 15 MIN: Performed by: OTOLARYNGOLOGY

## 2024-07-01 PROCEDURE — 3600000012 HC SURGERY LEVEL 2 ADDTL 15MIN: Performed by: OTOLARYNGOLOGY

## 2024-07-01 PROCEDURE — 31536 LARYNGOSCOPY W/BX & OP SCOPE: CPT | Performed by: OTOLARYNGOLOGY

## 2024-07-01 PROCEDURE — 6360000002 HC RX W HCPCS

## 2024-07-01 PROCEDURE — 6370000000 HC RX 637 (ALT 250 FOR IP): Performed by: OTOLARYNGOLOGY

## 2024-07-01 PROCEDURE — 7100000010 HC PHASE II RECOVERY - FIRST 15 MIN: Performed by: OTOLARYNGOLOGY

## 2024-07-01 RX ORDER — SODIUM CHLORIDE 0.9 % (FLUSH) 0.9 %
5-40 SYRINGE (ML) INJECTION PRN
Status: DISCONTINUED | OUTPATIENT
Start: 2024-07-01 | End: 2024-07-01 | Stop reason: HOSPADM

## 2024-07-01 RX ORDER — SODIUM CHLORIDE 9 MG/ML
INJECTION, SOLUTION INTRAVENOUS PRN
Status: DISCONTINUED | OUTPATIENT
Start: 2024-07-01 | End: 2024-07-01 | Stop reason: HOSPADM

## 2024-07-01 RX ORDER — EPINEPHRINE NASAL SOLUTION 1 MG/ML
SOLUTION NASAL PRN
Status: DISCONTINUED | OUTPATIENT
Start: 2024-07-01 | End: 2024-07-01 | Stop reason: ALTCHOICE

## 2024-07-01 RX ORDER — OXYCODONE HYDROCHLORIDE 5 MG/1
5 TABLET ORAL
Status: DISCONTINUED | OUTPATIENT
Start: 2024-07-01 | End: 2024-07-01 | Stop reason: HOSPADM

## 2024-07-01 RX ORDER — PROPOFOL 10 MG/ML
INJECTION, EMULSION INTRAVENOUS PRN
Status: DISCONTINUED | OUTPATIENT
Start: 2024-07-01 | End: 2024-07-01 | Stop reason: SDUPTHER

## 2024-07-01 RX ORDER — DEXMEDETOMIDINE HYDROCHLORIDE 100 UG/ML
INJECTION, SOLUTION INTRAVENOUS PRN
Status: DISCONTINUED | OUTPATIENT
Start: 2024-07-01 | End: 2024-07-01 | Stop reason: SDUPTHER

## 2024-07-01 RX ORDER — SODIUM CHLORIDE 0.9 % (FLUSH) 0.9 %
5-40 SYRINGE (ML) INJECTION EVERY 12 HOURS SCHEDULED
Status: DISCONTINUED | OUTPATIENT
Start: 2024-07-01 | End: 2024-07-01 | Stop reason: HOSPADM

## 2024-07-01 RX ORDER — MIDAZOLAM HYDROCHLORIDE 1 MG/ML
INJECTION INTRAMUSCULAR; INTRAVENOUS PRN
Status: DISCONTINUED | OUTPATIENT
Start: 2024-07-01 | End: 2024-07-01 | Stop reason: SDUPTHER

## 2024-07-01 RX ORDER — LIDOCAINE HYDROCHLORIDE 20 MG/ML
INJECTION, SOLUTION EPIDURAL; INFILTRATION; INTRACAUDAL; PERINEURAL PRN
Status: DISCONTINUED | OUTPATIENT
Start: 2024-07-01 | End: 2024-07-01 | Stop reason: SDUPTHER

## 2024-07-01 RX ORDER — FENTANYL CITRATE 50 UG/ML
INJECTION, SOLUTION INTRAMUSCULAR; INTRAVENOUS PRN
Status: DISCONTINUED | OUTPATIENT
Start: 2024-07-01 | End: 2024-07-01 | Stop reason: SDUPTHER

## 2024-07-01 RX ORDER — HYDROCODONE BITARTRATE AND ACETAMINOPHEN 5; 325 MG/1; MG/1
1 TABLET ORAL EVERY 6 HOURS PRN
Qty: 12 TABLET | Refills: 0 | Status: SHIPPED | OUTPATIENT
Start: 2024-07-01 | End: 2024-07-04

## 2024-07-01 RX ORDER — ROCURONIUM BROMIDE 10 MG/ML
INJECTION, SOLUTION INTRAVENOUS PRN
Status: DISCONTINUED | OUTPATIENT
Start: 2024-07-01 | End: 2024-07-01 | Stop reason: SDUPTHER

## 2024-07-01 RX ORDER — FENTANYL CITRATE 50 UG/ML
50 INJECTION, SOLUTION INTRAMUSCULAR; INTRAVENOUS EVERY 5 MIN PRN
Status: DISCONTINUED | OUTPATIENT
Start: 2024-07-01 | End: 2024-07-01 | Stop reason: HOSPADM

## 2024-07-01 RX ORDER — PROCHLORPERAZINE EDISYLATE 5 MG/ML
5 INJECTION INTRAMUSCULAR; INTRAVENOUS
Status: DISCONTINUED | OUTPATIENT
Start: 2024-07-01 | End: 2024-07-01 | Stop reason: HOSPADM

## 2024-07-01 RX ORDER — ONDANSETRON 2 MG/ML
INJECTION INTRAMUSCULAR; INTRAVENOUS PRN
Status: DISCONTINUED | OUTPATIENT
Start: 2024-07-01 | End: 2024-07-01 | Stop reason: SDUPTHER

## 2024-07-01 RX ORDER — ONDANSETRON 4 MG/1
4 TABLET, FILM COATED ORAL EVERY 8 HOURS PRN
Qty: 6 TABLET | Refills: 0 | Status: SHIPPED | OUTPATIENT
Start: 2024-07-01

## 2024-07-01 RX ORDER — KETOROLAC TROMETHAMINE 30 MG/ML
15 INJECTION, SOLUTION INTRAMUSCULAR; INTRAVENOUS
Status: COMPLETED | OUTPATIENT
Start: 2024-07-01 | End: 2024-07-01

## 2024-07-01 RX ORDER — NALOXONE HYDROCHLORIDE 0.4 MG/ML
INJECTION, SOLUTION INTRAMUSCULAR; INTRAVENOUS; SUBCUTANEOUS PRN
Status: DISCONTINUED | OUTPATIENT
Start: 2024-07-01 | End: 2024-07-01 | Stop reason: HOSPADM

## 2024-07-01 RX ADMIN — SODIUM CHLORIDE: 9 INJECTION, SOLUTION INTRAVENOUS at 13:38

## 2024-07-01 RX ADMIN — KETOROLAC TROMETHAMINE 15 MG: 30 INJECTION, SOLUTION INTRAMUSCULAR at 16:14

## 2024-07-01 RX ADMIN — MIDAZOLAM 2 MG: 1 INJECTION INTRAMUSCULAR; INTRAVENOUS at 13:38

## 2024-07-01 RX ADMIN — ONDANSETRON 4 MG: 2 INJECTION INTRAMUSCULAR; INTRAVENOUS at 13:45

## 2024-07-01 RX ADMIN — SUGAMMADEX 400 MG: 100 INJECTION, SOLUTION INTRAVENOUS at 14:16

## 2024-07-01 RX ADMIN — HYDROMORPHONE HYDROCHLORIDE 0.5 MG: 1 INJECTION, SOLUTION INTRAMUSCULAR; INTRAVENOUS; SUBCUTANEOUS at 14:54

## 2024-07-01 RX ADMIN — DEXMEDETOMIDINE 20 MCG: 100 INJECTION, SOLUTION INTRAVENOUS at 13:43

## 2024-07-01 RX ADMIN — PROPOFOL 160 MG: 10 INJECTION, EMULSION INTRAVENOUS at 13:42

## 2024-07-01 RX ADMIN — LIDOCAINE HYDROCHLORIDE 80 MG: 20 INJECTION, SOLUTION EPIDURAL; INFILTRATION; INTRACAUDAL; PERINEURAL at 13:42

## 2024-07-01 RX ADMIN — FENTANYL CITRATE 50 MCG: 50 INJECTION, SOLUTION INTRAMUSCULAR; INTRAVENOUS at 13:57

## 2024-07-01 RX ADMIN — FENTANYL CITRATE 50 MCG: 50 INJECTION, SOLUTION INTRAMUSCULAR; INTRAVENOUS at 13:42

## 2024-07-01 RX ADMIN — ROCURONIUM BROMIDE 30 MG: 10 INJECTION, SOLUTION INTRAVENOUS at 13:42

## 2024-07-01 ASSESSMENT — PAIN DESCRIPTION - ORIENTATION
ORIENTATION: INNER
ORIENTATION: INNER

## 2024-07-01 ASSESSMENT — PAIN DESCRIPTION - LOCATION
LOCATION: HEAD
LOCATION: HEAD
LOCATION: THROAT

## 2024-07-01 ASSESSMENT — PAIN SCALES - GENERAL
PAINLEVEL_OUTOF10: 6
PAINLEVEL_OUTOF10: 4
PAINLEVEL_OUTOF10: 4
PAINLEVEL_OUTOF10: 7
PAINLEVEL_OUTOF10: 6

## 2024-07-01 ASSESSMENT — PAIN DESCRIPTION - ONSET: ONSET: ON-GOING

## 2024-07-01 ASSESSMENT — PAIN DESCRIPTION - DESCRIPTORS
DESCRIPTORS: DISCOMFORT
DESCRIPTORS: ACHING

## 2024-07-01 ASSESSMENT — PAIN - FUNCTIONAL ASSESSMENT
PAIN_FUNCTIONAL_ASSESSMENT: PREVENTS OR INTERFERES SOME ACTIVE ACTIVITIES AND ADLS
PAIN_FUNCTIONAL_ASSESSMENT: 0-10
PAIN_FUNCTIONAL_ASSESSMENT: 0-10

## 2024-07-01 ASSESSMENT — LIFESTYLE VARIABLES: SMOKING_STATUS: 0

## 2024-07-01 ASSESSMENT — PAIN DESCRIPTION - FREQUENCY: FREQUENCY: CONTINUOUS

## 2024-07-01 ASSESSMENT — PAIN DESCRIPTION - PAIN TYPE: TYPE: ACUTE PAIN

## 2024-07-01 NOTE — DISCHARGE INSTRUCTIONS
Follow up with Dr Jamison in 10 days  [phone]    Avoid voice use for 5 day(s), avoid whispering    Soft diet for the first few days, the advance as tolerated

## 2024-07-01 NOTE — H&P
Sara Hastings was seen and re-examined preoperatively today, July 1, 2024.  There was no substantial change in her physical and medical status.  Patient is fit for the proposed surgical procedure.  All questions were appropriately addressed and had no further questions regarding the risks, benefits, and alternatives of the procedure.  Sara Hastings and family wished to proceed.    Arnol Alegria DO  Resident Physician  [facility]  Otolaryngology Residency  7/1/2024  1:20 PM

## 2024-07-01 NOTE — OP NOTE
Operative Note      Patient: Sara Hastings  YOB: 1990  MRN: [id number]    Date of Procedure: 7/1/2024    Pre-Op Diagnosis Codes:     * Lesion of true vocal cord [J38.3]    Post-Op Diagnosis: Same       Procedure(s):  DIRECT MICROLARYNGOSCOPY WITH BIOPSY    Surgeon(s):  Jemal Jamison DO    Assistant:   * No surgical staff found *    Anesthesia: General    Estimated Blood Loss (mL): less than 10 cc    Complications: None    Specimens:   ID Type Source Tests Collected by Time Destination   A : ANTERIOR COMMISSURE Tissue Tissue SURGICAL PATHOLOGY Jemal Jamison,  7/1/2024 1403    B : RIGHT VOCAL CORD Tissue Tissue SURGICAL PATHOLOGY Jemal Jamison,  7/1/2024 1405    C : LEFT VOCAL CORD Tissue Tissue SURGICAL PATHOLOGY Jemal Jamison,  7/1/2024 1407        Implants:  * No implants in log *      Drains: * No LDAs found *    Findings:  Infection Present At Time Of Surgery (PATOS) (choose all levels that have infection present):  No infection present  Other Findings: mucosal changes to the bilateral anterior true vocal cords and anterior commissure with pedunculated lesion to the anterior commissure, does not appear to extend subglottically    Indications: Patient is a 33 year old female with 2 year history of hoarseness despite medical therapy. In office scope exam with mucosal changes to the bilateral anterior true vocal cords. The risks and benefits of biopsy were discussed and she wished to proceed.     PROCEDURE: The patient was consented preoperatively, taken back to the operating room and identified appropriately, placed in supine position, given anesthesia for general intubation with a 6-0 tube with which the   patient was intubated. The patient was turned at a 90-degree angle and the head of   the bed was elevated to approximately 3-1/2 to 4 feet off the ground.     Direct laryngoscopy    The patient's teeth were protected. she has good  dentition,

## 2024-07-01 NOTE — ANESTHESIA PRE PROCEDURE
smoker                          ROS comment: Lesion of vocal cord   Cardiovascular:Negative CV ROS  Exercise tolerance: good (>4 METS)           Beta Blocker:  Not on Beta Blocker         Neuro/Psych:   Negative Neuro/Psych ROS              GI/Hepatic/Renal: Neg GI/Hepatic/Renal ROS            Endo/Other: Negative Endo/Other ROS                    Abdominal:             Vascular: negative vascular ROS.         Other Findings:             Anesthesia Plan      general     ASA 2       Induction: intravenous.    MIPS: Prophylactic antiemetics administered.  Anesthetic plan and risks discussed with patient and spouse.      Plan discussed with CRNA.                    Myrtle Reyes DO   7/1/2024

## 2024-07-01 NOTE — ANESTHESIA POSTPROCEDURE EVALUATION
Department of Anesthesiology  Postprocedure Note    Patient: Sara Hastings  MRN: [id number]  YOB: 1990  Date of evaluation: 7/1/2024    Procedure Summary       Date: 07/01/24 Room / Location: [address]    Anesthesia Start: 1332 Anesthesia Stop: 1434    Procedure: DIRECT MICROLARYNGOSCOPY WITH BIOPSY (Bilateral: Throat) Diagnosis:       Lesion of true vocal cord      (Lesion of true vocal cord [J38.3])    Surgeons: Jemal Jamison DO Responsible Provider: Myrtle Reyes DO    Anesthesia Type: general ASA Status: 2            Anesthesia Type: No value filed.    Shaina Phase I: Shaina Score: 10    Shaina Phase II: Shaina Score: 10    Anesthesia Post Evaluation    Patient location during evaluation: PACU  Patient participation: complete - patient participated  Level of consciousness: awake and alert  Nausea & Vomiting: no vomiting and no nausea  Cardiovascular status: hemodynamically stable  Respiratory status: acceptable and spontaneous ventilation  Hydration status: stable  Pain management: adequate    No notable events documented.

## 2024-07-09 LAB — SURGICAL PATHOLOGY REPORT: NORMAL

## 2024-07-16 ENCOUNTER — OFFICE VISIT (OUTPATIENT)
Dept: ENT CLINIC | Age: 34
End: 2024-07-16

## 2024-07-16 ENCOUNTER — HOSPITAL ENCOUNTER (OUTPATIENT)
Dept: CT IMAGING | Age: 34
Discharge: HOME | End: 2024-07-18
Attending: OTOLARYNGOLOGY
Payer: COMMERCIAL

## 2024-07-16 VITALS — WEIGHT: 196 LBS | BODY MASS INDEX: 36.07 KG/M2 | HEIGHT: 62 IN

## 2024-07-16 DIAGNOSIS — C32.9 LARYNGEAL CANCER (HCC): ICD-10-CM

## 2024-07-16 DIAGNOSIS — J38.3 LESION OF VOCAL FOLD: ICD-10-CM

## 2024-07-16 DIAGNOSIS — J38.3 LESION OF TRUE VOCAL CORD: ICD-10-CM

## 2024-07-16 DIAGNOSIS — C32.9 LARYNGEAL CANCER (HCC): Primary | ICD-10-CM

## 2024-07-16 PROCEDURE — 70491 CT SOFT TISSUE NECK W/DYE: CPT

## 2024-07-16 PROCEDURE — 6360000004 HC RX CONTRAST MEDICATION: Performed by: RADIOLOGY

## 2024-07-16 RX ADMIN — IOPAMIDOL 75 ML: 755 INJECTION, SOLUTION INTRAVENOUS at 17:18

## 2024-07-16 ASSESSMENT — ENCOUNTER SYMPTOMS
GASTROINTESTINAL NEGATIVE: 1
EYES NEGATIVE: 1
VOICE CHANGE: 1
RHINORRHEA: 0
SHORTNESS OF BREATH: 0
COLOR CHANGE: 0
SINUS PRESSURE: 1
ABDOMINAL PAIN: 0
RESPIRATORY NEGATIVE: 1

## 2024-07-16 NOTE — PROGRESS NOTES
Called and spoke with [facility] Patient is scheduled for PET scan CT @ [facility] 7/25/24 at noon arrival time 11:15am for injection scan at noon.[facility] will authorize scan.

## 2024-07-16 NOTE — PROGRESS NOTES
Subjective:      Patient ID:  Sara Hastings is a 33 y.o. female.    HPI:  here for post op panendoscopy surgery 1 week ago.  There are not changes since last visit. Voice still hoarse    Past Medical History:   Diagnosis Date    Hoarseness of voice      Past Surgical History:   Procedure Laterality Date    LARYNGOSCOPY Bilateral 7/1/2024    DIRECT MICROLARYNGOSCOPY WITH BIOPSY performed by Jemal Jamison DO at [facility] OR    WISDOM TOOTH EXTRACTION       Family History   Problem Relation Age of Onset    Seizures Mother     Unknown Father     Diabetes Maternal Grandmother     COPD Maternal Grandmother     Hypertension Maternal Grandfather      Social History     Socioeconomic History    Marital status: Single     Spouse name: None    Number of children: None    Years of education: None    Highest education level: None   Tobacco Use    Smoking status: Never    Smokeless tobacco: Never   Vaping Use    Vaping Use: Never used   Substance and Sexual Activity    Alcohol use: Not Currently    Drug use: Never    Sexual activity: Yes     Partners: Male     Birth control/protection: Condom     No Known Allergies        Review of Systems   Constitutional: Negative.  Negative for fever.   HENT:  Positive for sinus pressure, sneezing and voice change. Negative for congestion, postnasal drip and rhinorrhea.    Eyes: Negative.  Negative for visual disturbance.   Respiratory: Negative.  Negative for shortness of breath.    Cardiovascular: Negative.  Negative for chest pain.   Gastrointestinal: Negative.  Negative for abdominal pain.   Genitourinary: Negative.    Musculoskeletal: Negative.    Skin: Negative.  Negative for color change.   Allergic/Immunologic: Positive for environmental allergies.   Neurological: Negative.    Psychiatric/Behavioral: Negative.  Negative for behavioral problems and hallucinations.    All other systems reviewed and are negative.              Objective:   Physical Exam  Vitals and nursing note

## 2024-07-25 ENCOUNTER — HOSPITAL ENCOUNTER (OUTPATIENT)
Dept: PET IMAGING | Age: 34
Discharge: HOME | End: 2024-07-27
Attending: OTOLARYNGOLOGY
Payer: COMMERCIAL

## 2024-07-25 DIAGNOSIS — C32.9 LARYNGEAL CANCER (HCC): ICD-10-CM

## 2024-07-25 LAB — GLUCOSE BLD-MCNC: 95 MG/DL (ref 74–99)

## 2024-07-25 PROCEDURE — 82962 GLUCOSE BLOOD TEST: CPT

## 2024-07-25 PROCEDURE — 78815 PET IMAGE W/CT SKULL-THIGH: CPT

## 2024-07-25 PROCEDURE — A9609 HC RX DIAGNOSTIC RADIOPHARMACEUTICAL: HCPCS | Performed by: RADIOLOGY

## 2024-07-25 PROCEDURE — 3430000000 HC RX DIAGNOSTIC RADIOPHARMACEUTICAL: Performed by: RADIOLOGY

## 2024-07-25 RX ORDER — FLUDEOXYGLUCOSE F 18 200 MCI/ML
15 INJECTION, SOLUTION INTRAVENOUS
Status: COMPLETED | OUTPATIENT
Start: 2024-07-25 | End: 2024-07-25

## 2024-07-25 RX ADMIN — FLUDEOXYGLUCOSE F 18 15 MILLICURIE: 200 INJECTION, SOLUTION INTRAVENOUS at 11:56

## 2024-07-28 ENCOUNTER — CASE MANAGEMENT (OUTPATIENT)
Dept: OTOLARYNGOLOGY | Age: 34
End: 2024-07-28

## 2024-07-28 DIAGNOSIS — D02.0 SQUAMOUS CELL CARCINOMA IN SITU (SCCIS) OF TRUE VOCAL CORD: Primary | ICD-10-CM

## 2024-07-30 ENCOUNTER — CASE MANAGEMENT (OUTPATIENT)
Dept: OTOLARYNGOLOGY | Age: 34
End: 2024-07-30

## 2024-07-30 NOTE — PROGRESS NOTES
[facility] HEAD AND NECK TUMOR BOARD NOTE:     Sara Hastings Is a 33 y.o. female who was presented by Dr. Alegria at [facility] Head & Neck Tumor Board on 7/30/24 which included representatives from all Head & Neck disciplines (Medical oncology/Radiation oncology/Otolaryngology/Radiology/Pathology).     History and Physical in Brief:  Patient is a 33 year old female who presented with hoarseness for 2 years. NPL scope exam performed with mucosal changes to the bilateral anterior TVC and anterior commissure.     Imaging:?CT Neck with Contrast (7/16/24):  IMPRESSION:  1. No sign of any mass to correlate with a history of laryngeal malignancy.  Correlate with the clinical exam.  2. No cervical or supraclavicular lymphadenopathy.       PET/CT Head and Neck (7/25/24):  IMPRESSION:  Mild, diffusely increased metabolic activity within the larynx is likely  physiologic.  There is no evidence of focal hypermetabolic laryngeal lesion.     Procedures to date:?Direct Microlaryngoscopy with biopsies on 7/1/24     Pertinent Pathology:    Path Number: [id number]    -- Diagnosis --  A.  Anterior commissure, biopsy: Squamous cell carcinoma, at least in  situ, see comment.    B.  Right vocal cord, biopsy: Squamous cell carcinoma, at least in  situ, see comment.    C.  Left vocal cord, biopsy: Squamous cell carcinoma, at least in  situ, see comment.    Comment: All of the biopsy specimens are superficial in nature without  much associated subepithelial stroma (particularly in Parts A and B).  Definite stromal invasion is not seen in part C.  Intradepartmental  consultation is obtained.        The [facility] Neck Tumor Board considered available treatment options and made the following staging and recommendations:     Staging and Recommendations:     Site: bilateral anterior TVC and anterior commissure  Stage: [id number] vs. [id number]  Recommendation: Patient is a candidate for definitive RT vs.

## 2024-07-31 ENCOUNTER — TELEPHONE (OUTPATIENT)
Dept: ENT CLINIC | Age: 34
End: 2024-07-31

## 2024-07-31 LAB — SURGICAL PATHOLOGY REPORT: NORMAL

## 2024-07-31 NOTE — TELEPHONE ENCOUNTER
Sent Egully message.    Good afternoon, if you receive a bill please send it to our office. We have a team that will take care of it. Please let us know if you have any other questions.

## 2024-07-31 NOTE — TELEPHONE ENCOUNTER
Pt called office. She received a letter of denial from [facility] in the mail for her PET scan.  Wants to discuss that and also she has a questions about the outgoing referral for speech.  Please advise. Electronically signed by Teena Mcclure on 7/31/2024 at 1:27 PM

## 2024-08-06 ENCOUNTER — HOSPITAL ENCOUNTER (OUTPATIENT)
Dept: RADIATION ONCOLOGY | Age: 34
Discharge: HOME | End: 2024-08-06
Payer: COMMERCIAL

## 2024-08-06 VITALS
BODY MASS INDEX: 34.7 KG/M2 | TEMPERATURE: 97.6 F | HEART RATE: 61 BPM | WEIGHT: 189.7 LBS | RESPIRATION RATE: 18 BRPM | OXYGEN SATURATION: 97 % | SYSTOLIC BLOOD PRESSURE: 125 MMHG | DIASTOLIC BLOOD PRESSURE: 72 MMHG

## 2024-08-06 DIAGNOSIS — J38.3 LESION OF TRUE VOCAL CORD: Primary | ICD-10-CM

## 2024-08-06 PROCEDURE — 99205 OFFICE O/P NEW HI 60 MIN: CPT | Performed by: SPECIALIST

## 2024-08-06 PROCEDURE — 99205 OFFICE O/P NEW HI 60 MIN: CPT

## 2024-08-06 NOTE — PROGRESS NOTES
Sara Hastings  1990 33 y.o.      Referring Physician: Dr. Jamison     PCP: No primary care provider on file.     Vitals:    24 1027   BP: 125/72   Pulse: 61   Resp: 18   Temp: 97.6 °F (36.4 °C)   SpO2: 97%        Wt Readings from Last 3 Encounters:   24 86 kg (189 lb 11.2 oz)   24 88.9 kg (196 lb)   24 88.9 kg (196 lb)        Body mass index is 34.7 kg/m².               Chief Complaint: No chief complaint on file.         Cancer Staging   No matching staging information was found for the patient.    Prior Radiation Therapy? NO    Concurrent Chemo/radiation? NO    Prior Chemotherapy? NO    Prior Hormonal Therapy? NO    Head and Neck Cancer? Yes, patient has HN cancer AND consulting physician AGREES to place MBSS and speech therapy referral.      LMP: 8/3/2024    Age at first Menses: 10    : 2    Para: 2        Current Outpatient Medications   Medication Sig Dispense Refill    ondansetron (ZOFRAN) 4 MG tablet Take 1 tablet by mouth every 8 hours as needed for Nausea or Vomiting 6 tablet 0    fluticasone (FLONASE) 50 MCG/ACT nasal spray instill 2 sprays into each nostril once daily 16 g 3    loratadine (CLARITIN) 10 MG tablet Take 1 tablet by mouth daily      Prenatal Vit-Fe Fumarate-FA (PRENATAL VITAMINS) 28-0.8 MG TABS Take 1 tablet by mouth daily 30 tablet 11     No current facility-administered medications for this encounter.       Past Medical History:   Diagnosis Date    Hoarseness of voice        Past Surgical History:   Procedure Laterality Date    LARYNGOSCOPY Bilateral 2024    DIRECT MICROLARYNGOSCOPY WITH BIOPSY performed by Jemal Jamison DO at [facility] OR    WISDOM TOOTH EXTRACTION         Family History   Problem Relation Age of Onset    Seizures Mother     Unknown Father     Diabetes Maternal Grandmother     COPD Maternal Grandmother     Hypertension Maternal Grandfather        Social History     Socioeconomic History    Marital status: Single

## 2024-08-06 NOTE — PROGRESS NOTES
Radiation Oncology Consult Note         8/6/2024    Sara Hastings  33 y.o.   1990    REFERRING PROVIDER: Shaheen    PCP:  No primary care provider on file.    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    HISTORY OF PRESENT ILLNESS: Ms. Sara Hastings  is a 33 y.o. year old female presented with a 2-year history of progressive hoarseness.  She was referred to Dr Jamison who on 7/1/2024 performed an in office flexible fiberoptic laryngoscopy with biopsy.  This confirmed the presence of a bilateral anterior true vocal cord mass involving the anterior commissure.  This was biopsied and further confirmed to be squamous cell carcinoma, at least in situ.  Completion of the patient workup including a CT of the neck performed on 7/16/2024 was negative.  PET/CT on 7/25/2024 showed mild laryngeal uptake which was thought to be at least physiologic.  No other cervical adenopathy was noted.    Given the consolation of findings we have been asked to see the patient today in consultation regarding treatment options.    PAST MEDICAL HISTORY:      Diagnosis Date    Hoarseness of voice        PAST SURGICAL HISTORY:      Procedure Laterality Date    LARYNGOSCOPY Bilateral 7/1/2024    DIRECT MICROLARYNGOSCOPY WITH BIOPSY performed by Jemal Jamison DO at [facility] OR    WISDOM TOOTH EXTRACTION         No Known Allergies    MEDICATIONS:  Medications reviewed and reconciled.  Current Outpatient Medications   Medication Sig Dispense Refill    ondansetron (ZOFRAN) 4 MG tablet Take 1 tablet by mouth every 8 hours as needed for Nausea or Vomiting 6 tablet 0    fluticasone (FLONASE) 50 MCG/ACT nasal spray instill 2 sprays into each nostril once daily 16 g 3    loratadine (CLARITIN) 10 MG tablet Take 1 tablet by mouth daily

## 2024-08-08 ENCOUNTER — APPOINTMENT (OUTPATIENT)
Dept: OTOLARYNGOLOGY | Facility: CLINIC | Age: 34
End: 2024-08-08
Payer: COMMERCIAL

## 2024-08-08 VITALS — WEIGHT: 189 LBS | BODY MASS INDEX: 34.78 KG/M2 | HEIGHT: 62 IN

## 2024-08-08 DIAGNOSIS — R49.8 VOICE FATIGUE: ICD-10-CM

## 2024-08-08 DIAGNOSIS — R49.0 VOICE HOARSENESS: ICD-10-CM

## 2024-08-08 DIAGNOSIS — J38.3 VOCAL CORD MASS: ICD-10-CM

## 2024-08-08 DIAGNOSIS — R49.0 MUSCLE TENSION DYSPHONIA: ICD-10-CM

## 2024-08-08 DIAGNOSIS — Q31.9 DYSPLASIA OF LARYNX: Primary | ICD-10-CM

## 2024-08-08 DIAGNOSIS — C32.9 LARYNGEAL CANCER (MULTI): ICD-10-CM

## 2024-08-08 DIAGNOSIS — J38.7 LESION OF LARYNX: ICD-10-CM

## 2024-08-08 PROCEDURE — 3008F BODY MASS INDEX DOCD: CPT | Performed by: OTOLARYNGOLOGY

## 2024-08-08 PROCEDURE — 31579 LARYNGOSCOPY TELESCOPIC: CPT | Performed by: OTOLARYNGOLOGY

## 2024-08-08 PROCEDURE — 99205 OFFICE O/P NEW HI 60 MIN: CPT | Performed by: OTOLARYNGOLOGY

## 2024-08-08 RX ORDER — FLUTICASONE PROPIONATE 50 MCG
2 SPRAY, SUSPENSION (ML) NASAL DAILY
COMMUNITY

## 2024-08-08 RX ORDER — LORATADINE 10 MG/1
1 TABLET ORAL DAILY
COMMUNITY
Start: 2023-05-25

## 2024-08-08 ASSESSMENT — PATIENT HEALTH QUESTIONNAIRE - PHQ9
1. LITTLE INTEREST OR PLEASURE IN DOING THINGS: NOT AT ALL
2. FEELING DOWN, DEPRESSED OR HOPELESS: NOT AT ALL
SUM OF ALL RESPONSES TO PHQ9 QUESTIONS 1 AND 2: 0

## 2024-08-08 NOTE — PROGRESS NOTES
ASSESSMENT AND PLAN:   Shelby Davis is a 33 y.o. 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      However, some early glottic cancers can form within a RRP focus. I recommended that we avoid XRT until a second biopsy is performed and we can confirm. She is scheduled for a radiation simulation and I recommended she cancels this for now.     We also discussed that at her age and with the findings seen on her exam, she would benefit from surgical removal rather than XRT (better long term voice/swallow and avoid other toxicity from radiation).   Might need to consider treating one side at a time to avoid scar at anterior commissure.     I would like to see the patient back for the procedure described. She would benefit from post op speech therapy.     Patient and I discussed the risks, benefits and alternatives to surgery and all questions answered.  Cleared to OR.          NBI visualization of anterior commissure.         Reason For Consult  No chief complaint on file.      HISTORY OF PRESENT ILLNESS:  Shelby Davis is a 33 y.o. female presenting for an initial visit with me for hoarseness and a biopsy showing squamous cell carcinoma vs CIS. [id number]. A PET scan showed minimal uptake in the vocal cords.      The patient reports improvement of voice since the biopsy. She is not a smoker.   No changes to swallow.       Prior Pathology:   Path:    Path Number: [id number]    -- Diagnosis --  A.  Anterior commissure, biopsy: Squamous cell carcinoma, at least in  situ, see comment.    B.  Right vocal cord, biopsy: Squamous cell carcinoma, at least in  situ, see comment.    C.  Left vocal cord, biopsy: Squamous cell carcinoma, at least in  situ, see comment.    Comment: All of the biopsy specimens are superficial in nature without  much associated subepithelial  "stroma (particularly in Parts A and B).  Definite stromal invasion is not seen in part C.  Intradepartmental  consultation is obtained.      From Dr. Franco note:              Past Medical History  She has no past medical history on file. Surgical History  She has no past surgical history on file.   Social History  She reports that she has never smoked. She has never used smokeless tobacco. No history on file for alcohol use and drug use. Allergies  Patient has no known allergies.     Family History  No family history on file.     Review of Systems  All 10 systems were reviewed and negative except for above.      Physical Exam    ENT Physical Exam  Constitutional  Appearance: patient appears well-developed and well-nourished,  Head and Face  Appearance: head appears normal and face appears normal;  Ear  Auricles: right auricle normal; left auricle normal;  Nose  External Nose: nares patent bilaterally;  Oral Cavity/Oropharynx  Lips: normal;  Neck  Neck: neck normal; neck palpation normal;  Respiratory  Inspection: no retractions visible;  Cardiovascular  Inspection: no peripheral edema present;  Neurovestibular  Mental Status: alert and oriented;  Psychiatric: mood normal;  Cranial Nerves: cranial nerves intact;        Last Recorded Vitals  Height 1.575 m (5' 2\"), weight 85.7 kg (189 lb).      ----------------------------------------------------------------------  Procedures   Flexible Laryngoscopy w/ Videostroboscopy    VOICE AND SPEECH CHARACTERISTICS:  Normal spoken speech, (+) moderate dysphonia, (+) mild roughness, (+) mild breathiness, (+) mild asthenia, (+) moderate strain.    Intelligibility: reduced.   Resonance: balanced.   Vocal Loudness: reduced.   Breath Support: normal.    PROCEDURE:    Indications: voice change  PROCEDURE NOTE: FLEXIBLE LARYNGOSCOPY WITH STROBOSCOPY  I recommended a flexible laryngoscopy with stroboscopy based on PE findings, and/or concern for mucosal wave details based upon " history and/or for issues associated with hyperreflexic gag on mirror exam concerning for pathology. Risks, benefits, and alternatives were explained. The patient wishes to proceed and gives verbal consent.   Patient is seated in the exam chair. After adequate topical anesthesia, I advance the flexible endoscope. The examination included evaluation of the donahue, vallecula, base of tongue, pyriforms, post-cricoid area, larynx and immediate subglottis.  Findings : assessment of the nasopharynx, base of tongue/vallecula, pyriform sinuses, post-cricoid area and pharyngeal walls was without lesion or mass, pharyngeal wall contraction is normal and symmetric, and no pooling of secretions  IA thickenin (mild)  Gross Arytenoid Movement: symmetric.  Arytenoid Height: normal.   Supraglottic Tension: lateral.  Symmetry: asymmetry.   Amplitude: reduced: bilateral.  Phase Closure: in-phase.  Mucosal Wave Lateral Excursion/Secondary Wave: Bilateral Vocal Cord: moderate restriction - Wave moved up to ¼ the width of the vocal fold.  Periodicity: aperiodic.  Mass: anterior mass   Closure: irregular.      Time Spent  Prep time on day of patient encounter: 5-10 minutes  Time spent directly with patient, family or caregiver: 25 minutes  Additional Time Spent on Patient Care Activities/discuss care plan with SLP: 5 minutes  Documentation Time: 10 minutes  Other Time Spent: 10 minutes  Total: 60 minutes     Scribe Attestation  By signing my name below, Elissa KLINE , Scribe attest that this documentation has been prepared under the direction and in the presence of Tony Acosta MD.

## 2024-08-09 ENCOUNTER — TELEPHONE (OUTPATIENT)
Dept: OTOLARYNGOLOGY | Facility: CLINIC | Age: 34
End: 2024-08-09
Payer: COMMERCIAL

## 2024-08-09 NOTE — TELEPHONE ENCOUNTER
Topic: pt called office asking for earlier surgery date with Dr Acosta. Pt requesting to move from surgery date of 9/13 to earlier date of 8/16/2024    I called and confirmed with pt  that she wants surgery date moved from 9/13/24 to 8/16/24.    I also confirmed that this was ok with Dr Suazo nurse,  Natalia.  EPIC message sent to [facility] asking them to move to earlier surgery date of 8/16/2024

## 2024-08-13 ENCOUNTER — APPOINTMENT (OUTPATIENT)
Dept: RADIATION ONCOLOGY | Age: 34
End: 2024-08-13
Payer: COMMERCIAL

## 2024-08-14 ENCOUNTER — TELEPHONE (OUTPATIENT)
Dept: ONCOLOGY | Age: 34
End: 2024-08-14

## 2024-08-14 NOTE — TELEPHONE ENCOUNTER
Contacted pt at request of cancer center staff as introduction to oncology social work.  Pt reported that she was evaluated by an ENT in [place] and has elected to pursue surgery at this time.  She stated that she is scheduled for surgery on 8/16/2024 and was advised by ENT to place radiation on hold at this time.  Radiation oncology staff notified.    JOSE Macario, HOSSEIN  Oncology Social Worker

## 2024-08-16 ENCOUNTER — ANESTHESIA EVENT (OUTPATIENT)
Dept: OPERATING ROOM | Facility: HOSPITAL | Age: 34
End: 2024-08-16
Payer: COMMERCIAL

## 2024-08-16 ENCOUNTER — HOSPITAL ENCOUNTER (OUTPATIENT)
Facility: HOSPITAL | Age: 34
Setting detail: OUTPATIENT SURGERY
Discharge: HOME | End: 2024-08-16
Attending: OTOLARYNGOLOGY | Admitting: OTOLARYNGOLOGY
Payer: COMMERCIAL

## 2024-08-16 ENCOUNTER — ANESTHESIA (OUTPATIENT)
Dept: OPERATING ROOM | Facility: HOSPITAL | Age: 34
End: 2024-08-16
Payer: COMMERCIAL

## 2024-08-16 VITALS
BODY MASS INDEX: 35.17 KG/M2 | DIASTOLIC BLOOD PRESSURE: 83 MMHG | HEIGHT: 62 IN | HEART RATE: 75 BPM | TEMPERATURE: 97.5 F | OXYGEN SATURATION: 96 % | WEIGHT: 191.14 LBS | SYSTOLIC BLOOD PRESSURE: 149 MMHG | RESPIRATION RATE: 15 BRPM

## 2024-08-16 DIAGNOSIS — J38.7 LESION OF LARYNX: ICD-10-CM

## 2024-08-16 PROCEDURE — 3700000001 HC GENERAL ANESTHESIA TIME - INITIAL BASE CHARGE: Performed by: OTOLARYNGOLOGY

## 2024-08-16 PROCEDURE — 88331 PATH CONSLTJ SURG 1 BLK 1SPC: CPT | Performed by: SPECIALIST

## 2024-08-16 PROCEDURE — 3600000007 HC OR TIME - EACH INCREMENTAL 1 MINUTE - PROCEDURE LEVEL TWO: Performed by: OTOLARYNGOLOGY

## 2024-08-16 PROCEDURE — 31541 LARYNSCOP W/TUMR EXC + SCOPE: CPT | Performed by: OTOLARYNGOLOGY

## 2024-08-16 PROCEDURE — 3700000002 HC GENERAL ANESTHESIA TIME - EACH INCREMENTAL 1 MINUTE: Performed by: OTOLARYNGOLOGY

## 2024-08-16 PROCEDURE — 88332 PATH CONSLTJ SURG EA ADD BLK: CPT | Performed by: SPECIALIST

## 2024-08-16 PROCEDURE — 31622 DX BRONCHOSCOPE/WASH: CPT | Performed by: OTOLARYNGOLOGY

## 2024-08-16 PROCEDURE — A4649 SURGICAL SUPPLIES: HCPCS | Performed by: OTOLARYNGOLOGY

## 2024-08-16 PROCEDURE — 96372 THER/PROPH/DIAG INJ SC/IM: CPT | Performed by: OTOLARYNGOLOGY

## 2024-08-16 PROCEDURE — 2500000005 HC RX 250 GENERAL PHARMACY W/O HCPCS: Performed by: ANESTHESIOLOGIST ASSISTANT

## 2024-08-16 PROCEDURE — 2720000007 HC OR 272 NO HCPCS: Performed by: OTOLARYNGOLOGY

## 2024-08-16 PROCEDURE — 31545 REMOVE VC LESION W/SCOPE: CPT | Performed by: OTOLARYNGOLOGY

## 2024-08-16 PROCEDURE — 3600000002 HC OR TIME - INITIAL BASE CHARGE - PROCEDURE LEVEL TWO: Performed by: OTOLARYNGOLOGY

## 2024-08-16 PROCEDURE — 7100000010 HC PHASE TWO TIME - EACH INCREMENTAL 1 MINUTE: Performed by: OTOLARYNGOLOGY

## 2024-08-16 PROCEDURE — 88305 TISSUE EXAM BY PATHOLOGIST: CPT | Mod: TC,59,AHULAB | Performed by: OTOLARYNGOLOGY

## 2024-08-16 PROCEDURE — C1878 MATRL FOR VOCAL CORD: HCPCS | Performed by: OTOLARYNGOLOGY

## 2024-08-16 PROCEDURE — 88305 TISSUE EXAM BY PATHOLOGIST: CPT | Performed by: STUDENT IN AN ORGANIZED HEALTH CARE EDUCATION/TRAINING PROGRAM

## 2024-08-16 PROCEDURE — 2500000001 HC RX 250 WO HCPCS SELF ADMINISTERED DRUGS (ALT 637 FOR MEDICARE OP): Performed by: ANESTHESIOLOGY

## 2024-08-16 PROCEDURE — L8607 INJ VOCAL CORD BULKING AGENT: HCPCS | Performed by: OTOLARYNGOLOGY

## 2024-08-16 PROCEDURE — 31571 LARYNGOSCOP W/VC INJ + SCOPE: CPT | Performed by: OTOLARYNGOLOGY

## 2024-08-16 PROCEDURE — 2500000004 HC RX 250 GENERAL PHARMACY W/ HCPCS (ALT 636 FOR OP/ED): Performed by: OTOLARYNGOLOGY

## 2024-08-16 PROCEDURE — 7100000002 HC RECOVERY ROOM TIME - EACH INCREMENTAL 1 MINUTE: Performed by: OTOLARYNGOLOGY

## 2024-08-16 PROCEDURE — 2500000004 HC RX 250 GENERAL PHARMACY W/ HCPCS (ALT 636 FOR OP/ED): Performed by: ANESTHESIOLOGIST ASSISTANT

## 2024-08-16 PROCEDURE — 88331 PATH CONSLTJ SURG 1 BLK 1SPC: CPT | Mod: TC,SUR,AHULAB | Performed by: SPECIALIST

## 2024-08-16 PROCEDURE — 7100000009 HC PHASE TWO TIME - INITIAL BASE CHARGE: Performed by: OTOLARYNGOLOGY

## 2024-08-16 PROCEDURE — 2740000001 HC OR 274 NO HCPCS: Performed by: OTOLARYNGOLOGY

## 2024-08-16 PROCEDURE — 2500000005 HC RX 250 GENERAL PHARMACY W/O HCPCS: Performed by: OTOLARYNGOLOGY

## 2024-08-16 PROCEDURE — 7100000001 HC RECOVERY ROOM TIME - INITIAL BASE CHARGE: Performed by: OTOLARYNGOLOGY

## 2024-08-16 DEVICE — IMPLANT, PROLARYN GEL, 1.0CC W/NEEDLES: Type: IMPLANTABLE DEVICE | Site: THROAT | Status: FUNCTIONAL

## 2024-08-16 RX ORDER — FENTANYL CITRATE 50 UG/ML
INJECTION, SOLUTION INTRAMUSCULAR; INTRAVENOUS AS NEEDED
Status: DISCONTINUED | OUTPATIENT
Start: 2024-08-16 | End: 2024-08-16

## 2024-08-16 RX ORDER — SODIUM CHLORIDE 0.9 G/100ML
IRRIGANT IRRIGATION AS NEEDED
Status: DISCONTINUED | OUTPATIENT
Start: 2024-08-16 | End: 2024-08-16 | Stop reason: HOSPADM

## 2024-08-16 RX ORDER — LIDOCAINE HYDROCHLORIDE 20 MG/ML
INJECTION, SOLUTION EPIDURAL; INFILTRATION; INTRACAUDAL; PERINEURAL AS NEEDED
Status: DISCONTINUED | OUTPATIENT
Start: 2024-08-16 | End: 2024-08-16

## 2024-08-16 RX ORDER — PROPOFOL 10 MG/ML
INJECTION, EMULSION INTRAVENOUS AS NEEDED
Status: DISCONTINUED | OUTPATIENT
Start: 2024-08-16 | End: 2024-08-16

## 2024-08-16 RX ORDER — ONDANSETRON HYDROCHLORIDE 2 MG/ML
4 INJECTION, SOLUTION INTRAVENOUS ONCE AS NEEDED
Status: DISCONTINUED | OUTPATIENT
Start: 2024-08-16 | End: 2024-08-16 | Stop reason: HOSPADM

## 2024-08-16 RX ORDER — EPINEPHRINE 1 MG/ML
INJECTION INTRAMUSCULAR; INTRAVENOUS; SUBCUTANEOUS AS NEEDED
Status: DISCONTINUED | OUTPATIENT
Start: 2024-08-16 | End: 2024-08-16 | Stop reason: HOSPADM

## 2024-08-16 RX ORDER — ONDANSETRON HYDROCHLORIDE 2 MG/ML
INJECTION, SOLUTION INTRAVENOUS AS NEEDED
Status: DISCONTINUED | OUTPATIENT
Start: 2024-08-16 | End: 2024-08-16

## 2024-08-16 RX ORDER — OXYCODONE HYDROCHLORIDE 5 MG/1
5 TABLET ORAL EVERY 4 HOURS PRN
Status: DISCONTINUED | OUTPATIENT
Start: 2024-08-16 | End: 2024-08-16 | Stop reason: HOSPADM

## 2024-08-16 RX ORDER — MAG CARB/ALUMINUM HYDROX/ALGIN 358-95/15
10 SUSPENSION, ORAL (FINAL DOSE FORM) ORAL
Qty: 560 ML | Refills: 0 | Status: SHIPPED | OUTPATIENT
Start: 2024-08-16 | End: 2024-08-16 | Stop reason: HOSPADM

## 2024-08-16 RX ORDER — LIDOCAINE HYDROCHLORIDE 10 MG/ML
0.1 INJECTION, SOLUTION EPIDURAL; INFILTRATION; INTRACAUDAL; PERINEURAL ONCE
Status: DISCONTINUED | OUTPATIENT
Start: 2024-08-16 | End: 2024-08-16 | Stop reason: HOSPADM

## 2024-08-16 RX ORDER — LABETALOL HYDROCHLORIDE 5 MG/ML
5 INJECTION, SOLUTION INTRAVENOUS ONCE AS NEEDED
Status: DISCONTINUED | OUTPATIENT
Start: 2024-08-16 | End: 2024-08-16 | Stop reason: HOSPADM

## 2024-08-16 RX ORDER — SODIUM CHLORIDE, SODIUM LACTATE, POTASSIUM CHLORIDE, CALCIUM CHLORIDE 600; 310; 30; 20 MG/100ML; MG/100ML; MG/100ML; MG/100ML
100 INJECTION, SOLUTION INTRAVENOUS CONTINUOUS
Status: DISCONTINUED | OUTPATIENT
Start: 2024-08-16 | End: 2024-08-16 | Stop reason: HOSPADM

## 2024-08-16 RX ORDER — DEXAMETHASONE SODIUM PHOSPHATE 10 MG/ML
INJECTION INTRAMUSCULAR; INTRAVENOUS AS NEEDED
Status: DISCONTINUED | OUTPATIENT
Start: 2024-08-16 | End: 2024-08-16 | Stop reason: HOSPADM

## 2024-08-16 RX ORDER — MAG CARB/ALUMINUM HYDROX/ALGIN 358-95/15
5 SUSPENSION, ORAL (FINAL DOSE FORM) ORAL 2 TIMES DAILY
Qty: 355 ML | Refills: 0 | Status: SHIPPED | OUTPATIENT
Start: 2024-08-16 | End: 2024-08-16

## 2024-08-16 RX ORDER — ALBUTEROL SULFATE 0.83 MG/ML
2.5 SOLUTION RESPIRATORY (INHALATION) ONCE AS NEEDED
Status: DISCONTINUED | OUTPATIENT
Start: 2024-08-16 | End: 2024-08-16 | Stop reason: HOSPADM

## 2024-08-16 RX ORDER — HYDRALAZINE HYDROCHLORIDE 20 MG/ML
5 INJECTION INTRAMUSCULAR; INTRAVENOUS EVERY 30 MIN PRN
Status: DISCONTINUED | OUTPATIENT
Start: 2024-08-16 | End: 2024-08-16 | Stop reason: HOSPADM

## 2024-08-16 RX ORDER — ROCURONIUM BROMIDE 10 MG/ML
INJECTION, SOLUTION INTRAVENOUS AS NEEDED
Status: DISCONTINUED | OUTPATIENT
Start: 2024-08-16 | End: 2024-08-16

## 2024-08-16 RX ORDER — MIDAZOLAM HYDROCHLORIDE 1 MG/ML
INJECTION INTRAMUSCULAR; INTRAVENOUS AS NEEDED
Status: DISCONTINUED | OUTPATIENT
Start: 2024-08-16 | End: 2024-08-16

## 2024-08-16 RX ORDER — ESMOLOL HYDROCHLORIDE 10 MG/ML
INJECTION INTRAVENOUS AS NEEDED
Status: DISCONTINUED | OUTPATIENT
Start: 2024-08-16 | End: 2024-08-16

## 2024-08-16 RX ORDER — MAG CARB/ALUMINUM HYDROX/ALGIN 358-95/15
10 SUSPENSION, ORAL (FINAL DOSE FORM) ORAL
Qty: 560 ML | Refills: 0 | Status: SHIPPED | OUTPATIENT
Start: 2024-08-16 | End: 2024-08-30

## 2024-08-16 RX ORDER — SODIUM CHLORIDE, SODIUM LACTATE, POTASSIUM CHLORIDE, CALCIUM CHLORIDE 600; 310; 30; 20 MG/100ML; MG/100ML; MG/100ML; MG/100ML
INJECTION, SOLUTION INTRAVENOUS CONTINUOUS PRN
Status: DISCONTINUED | OUTPATIENT
Start: 2024-08-16 | End: 2024-08-16

## 2024-08-16 ASSESSMENT — PAIN - FUNCTIONAL ASSESSMENT
PAIN_FUNCTIONAL_ASSESSMENT: 0-10
PAIN_FUNCTIONAL_ASSESSMENT: UNABLE TO SELF-REPORT
PAIN_FUNCTIONAL_ASSESSMENT: 0-10

## 2024-08-16 ASSESSMENT — PAIN SCALES - PAIN ASSESSMENT IN ADVANCED DEMENTIA (PAINAD): TOTALSCORE: MEDICATION (SEE MAR)

## 2024-08-16 ASSESSMENT — ENCOUNTER SYMPTOMS
FEVER: 0
CARDIOVASCULAR NEGATIVE: 1
PSYCHIATRIC NEGATIVE: 1

## 2024-08-16 ASSESSMENT — COLUMBIA-SUICIDE SEVERITY RATING SCALE - C-SSRS
2. HAVE YOU ACTUALLY HAD ANY THOUGHTS OF KILLING YOURSELF?: NO
1. IN THE PAST MONTH, HAVE YOU WISHED YOU WERE DEAD OR WISHED YOU COULD GO TO SLEEP AND NOT WAKE UP?: NO
6. HAVE YOU EVER DONE ANYTHING, STARTED TO DO ANYTHING, OR PREPARED TO DO ANYTHING TO END YOUR LIFE?: NO

## 2024-08-16 ASSESSMENT — PAIN DESCRIPTION - DESCRIPTORS
DESCRIPTORS: ACHING

## 2024-08-16 ASSESSMENT — PAIN SCALES - GENERAL
PAINLEVEL_OUTOF10: 0 - NO PAIN
PAINLEVEL_OUTOF10: 5 - MODERATE PAIN
PAINLEVEL_OUTOF10: 3
PAINLEVEL_OUTOF10: 3
PAINLEVEL_OUTOF10: 2
PAINLEVEL_OUTOF10: 5 - MODERATE PAIN

## 2024-08-16 NOTE — H&P
History Of Present Illness  Shelby Davis is a 33 y.o. 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      However, some early glottic cancers can form within a RRP focus. I recommended that we avoid XRT until a second biopsy is performed and we can confirm. She is scheduled for a radiation simulation and I recommended she cancels this for now.      We also discussed that at her age and with the findings seen on her exam, she would benefit from surgical removal rather than XRT (better long term voice/swallow and avoid other toxicity from radiation).   Might need to consider treating one side at a time to avoid scar at anterior commissure.      I would like to see the patient back for the procedure described. She would benefit from post op speech therapy.      Patient and I discussed the risks, benefits and alternatives to surgery and all questions answered.  Cleared to OR.         .     Past Medical History  History reviewed. No pertinent past medical history.    Surgical History  History reviewed. No pertinent surgical history.     Social History  She reports that she has never smoked. She has never used smokeless tobacco. She reports that she does not currently use alcohol. She reports that she does not use drugs.    Family History  No family history on file.     Allergies  Patient has no known allergies.    Review of Systems   Constitutional:  Negative for fever.   Cardiovascular: Negative.  Negative for chest pain.   Genitourinary: Negative.    Skin: Negative.    Psychiatric/Behavioral: Negative.     All other systems reviewed and are negative.       Physical Exam  Constitutional:       General: She is not in acute distress.     Appearance: She is not ill-appearing.   HENT:      Right Ear: External ear normal.      Left Ear: External ear normal.      Nose: Nose normal.       Mouth/Throat:      Mouth: Mucous membranes are moist.   Eyes:      Pupils: Pupils are equal, round, and reactive to light.   Pulmonary:      Effort: Pulmonary effort is normal.   Skin:     General: Skin is warm and dry.   Neurological:      General: No focal deficit present.   Psychiatric:         Mood and Affect: Mood normal.        Assessment/Plan   Assessment & Plan  Lesion of larynx    Patient and I discussed the risks, benefits and alternatives to surgery and all questions answered.  Cleared to OR.         Tony Acosta MD

## 2024-08-16 NOTE — ANESTHESIA PREPROCEDURE EVALUATION
Patient: Shelby Davis    Procedure Information       Date/Time: 08/16/24 0930    Procedure: Microlaryngoscopy; Bronchoscopy; Biopsy; Injection; CO2 & KTP Laser    Location: [facility] A OR 03 / Virtual [facility] A OR    Surgeons: Tony Acosta MD          34 yo F hx hoarseness.  Denies other medical issues.  No issues with anesthesia before.    Relevant Problems   No relevant active problems       Clinical information reviewed:   Tobacco  Allergies  Meds   Med Hx  Surg Hx   Fam Hx  Soc Hx        NPO Detail:  NPO/Void Status  Date of Last Liquid: 08/15/24  Time of Last Liquid: 2130  Date of Last Solid: 08/15/24  Time of Last Solid: 2130  Last Intake Type: Clear fluids         Physical Exam    Airway  Mallampati: II  TM distance: >3 FB  Neck ROM: full     Cardiovascular   Rate: normal     Dental - normal exam     Pulmonary - normal exam     Abdominal            Anesthesia Plan    History of general anesthesia?: yes  History of complications of general anesthesia?: no    ASA 2     general     intravenous induction   Postoperative administration of opioids is intended.  Anesthetic plan and risks discussed with patient.

## 2024-08-16 NOTE — DISCHARGE INSTRUCTIONS
".  Postoperative Care Instructions:  Microdirect Laryngoscopy/Bronchoscopy with Laser Surgery for Laryngeal Mass/Lesions    Postoperative Care:    Please rest your voice for five days. You may speak with people within arms length but no yelling, shouting, singing, or straining. Avoid use when possible.     Humidification will help with healing. Use a humidifier in the bedroom at night.    Use Gaviscon as prescribed.    For your surgery, special microscopic instruments were used and an operating telescope was placed in your mouth to look at your vocal cords and trachea to treat your airway. No external incisions made.      It is normal for your voice to be hoarse for several days or weeks after surgery while the healing process occurs.     Your surgeon may also ask you to perform \"Voice rest\" which means no speaking for the period of time requested. Once you are allowed to start talking, it is very important to use a “conservative” or “confidential voice” after surgery to allow your voice to recover.   This means that you are using your normal voice at a much lower volume than usual, without any straining, yelling, screaming, or singing.  This typically sounds like a soft or breathy whisper.  It is also important to avoid extended periods of voice use.   Do not speak to anyone who is farther than an arm's length away, as this would require you to raise your voice.      It is very important to avoid excessive coughing or throat clearing after surgery, as this will slow down the healing process.  If you have an urge to cough that you cannot control on your own with relaxation techniques, you can purchase an over-the-counter cough suppressant to use, but make sure it does not interact with your other medications.      Finally, make sure to drink plenty of water to stay well hydrated after surgery, as this will help to speed your recovery by keeping your secretions thin and your vocal cords moist.  Use of cough lozenges " is also allowed.      Diet: You may resume your normal diet after surgery. You may want to start out with liquids and light meals or soft foods and advance to your usual diet as tolerated.     Our Nurse will contact you a few days after surgery to schedule your follow up appointment, which is typically 3-6 weeks after surgery.  For any questions or concerns, please call the respective office between the hours of 9am-4pm.   Please call:  Dr. Acosta's office @ [phone]  Dr. Alonzo's office @ [phone]   Dr. Cazares's office @ [phone]  If issues arise over the weekend or after hours, please call our hospital  at [phone] and ask for the ENT resident on call.    What to Expect Following Surgery:    The following are some symptoms that may follow your recent surgery:    Pain - Some mild pain is normal after surgery.  As adequate pain control is necessary for healing, please take over-the-counter Tylenol or Motrin per the package directions as needed for pain.  Occasionally, a narcotic pain medication is prescribed for your use after surgery.  If this is the case, please take the medication only as directed.  You may need to take an over-the-counter stool softener as narcotic pain medications can cause constipation. Massage, cold packs, relaxation techniques, listening to music, and positive thinking will also help control your postoperative pain.    Taste disturbance and/or tongue numbness - Due to the surgical instruments used during surgery, you may experience tongue numbness and/or taste disturbance after surgery, but this is usually temporary.  It may take 1-4 weeks to completely resolve.  It is also normal for your tongue to feel swollen or bruised after surgery, and this will also resolve in a few days.    Bleeding - For a few days after surgery, it is normal to cough up some blood in your mucus.  However, if you experience continuous bright red bleeding from your mouth or if you are coughing  up blood clots, please call your doctor's office immediately.  If you are on any blood thinning medications, such as Aspirin, Plavix, or Coumadin, you may restart them immediately after surgery unless you were specifically told not to do so by your surgeon.    Muscle aches/soreness - You may experience generalized muscle aches and/or soreness after surgery, and this is a normal effect of anesthesia.  They should completely resolve after a few days.     Swelling/throat tightness - If you were given steroid medication, this can help with swelling and should be taken as directed. The side effects from steroid use is typically minimal when taken for short periods, as used in these cases. If you have questions, please discuss with your pharmacist.

## 2024-08-16 NOTE — ANESTHESIA PROCEDURE NOTES
Airway  Date/Time: 8/16/2024 8:54 AM  Urgency: elective    Airway not difficult    Staffing  Performed: SHER   Authorized by: Larry Aguiar MD    Performed by: SHER De Paz  Patient location during procedure: OR    Indications and Patient Condition  Indications for airway management: anesthesia  Spontaneous Ventilation: absent  Sedation level: deep  Preoxygenated: yes  Patient position: sniffing  Mask difficulty assessment: 1 - vent by mask    Final Airway Details  Final airway type: endotracheal airway      Successful airway: ETT and laser tube  Cuffed: yes   Successful intubation technique: video laryngoscopy  Facilitating devices/methods: intubating stylet  Endotracheal tube insertion site: oral  Blade: Hao  Blade size: #3  ETT size (mm): 5.0  Cormack-Lehane Classification: grade I - full view of glottis  Placement verified by: chest auscultation and capnometry   Measured from: lips  ETT to lips (cm): 21

## 2024-08-16 NOTE — OP NOTE
Microlaryngoscopy; Bronchoscopy; Biopsy; Injection; KTP Laser Operative Note     Date: 2024  OR Location: [facility] OR    Name: Shelby Davis : 1990, Age: 33 y.o., MRN: [id number], Sex: female    Diagnosis  Pre-op Diagnosis      * Lesion of larynx [J38.7] Post-op Diagnosis     * Lesion of larynx [J38.7]     Procedures  Microlaryngoscopy; Bronchoscopy; Biopsy; Injection; KTP Laser  54035 - MS LARYNGOSCOPY W/WO TRACHEOSCOPY W/MICRO/TELESCOPE    MS LARYNGOSCOPY W/BIOPSY MICROSCOPE/TELESCOPE [26156]  MS LARGSC EXC KWABENA&/STRPG CORDS/EPIGL MCRSCP/TLSCP [[id number]]  MS LARGSC W/NJX VOCAL CORD THER W/MICRO/TELESCOPE [[id number]]  MS BRNCHSC INCL FLUOR GDNCE DX W/CELL WASHG SPX [[id number]]  Surgeons      * Tony Acosta - Primary    Resident/Fellow/Other Assistant:  Jeanette    Procedure Summary  Anesthesia: General  ASA: II  Anesthesia Staff: Anesthesiologist: Larry Aguiar MD  C-AA: SHER De Paz  Estimated Blood Loss: 2 mL  Intra-op Medications:   Administrations occurring from 0930 to 1100 on 24:   Medication Name Total Dose   dexAMETHasone (Decadron) injection 1.1 mg              Anesthesia Record               Intraprocedure I/O Totals          Intake    LR infusion 500.00 mL    Total Intake 500 mL          Specimen:   ID Type Source Tests Collected by Time   1 : ANTERIOR LEFT VOCAL CORD LESION Tissue VOCAL CORD BIOPSY LEFT SURGICAL PATHOLOGY EXAM Tony Acosta MD 2024 0923   2 : DEEP RIGHT VOCAL CORD SCAR Tissue VOCAL CORD BIOPSY RIGHT SURGICAL PATHOLOGY EXAM Tony Acosta MD 2024 0932   3 : RIGHT VOCAL CORD LESION Tissue VOCAL CORD BIOPSY RIGHT SURGICAL PATHOLOGY EXAM Tony Acosta MD 2024 0935      Staff:   Circulator: Annmarie Wells Person: Kayla    Findings:   Raised, irregular lesion of the anterior commissure and right and left anterior true vocal fold with vascular changes overlying the mucosa suspicious for papilloma  Microflap excision performed of left superior  surface of true vocal fold sent for frozen pathology, 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  No invasion.   Microflap excision of right superior/medial surface of true vocal sent for permanent pathology. Irregular-appearing pale and firm tissue encountered at deep aspect of right anterior vocal fold possibly scar versus mass. 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   Unremarkable bronchoscopy down to level of bilateral mainstem bronchi        Right TVC excision with anterior Left mucosal preservation to prevent anterior web.       Indications: Shelby Davis is an 33 y.o. female who is having surgery for Lesion of larynx [J38.7].     The patient was seen in the preoperative area. The risks, benefits, complications, treatment options, non-operative alternatives, expected recovery and outcomes were discussed with the patient. The possibilities of reaction to medication, pulmonary aspiration, injury to surrounding structures, bleeding, recurrent infection, the need for additional procedures, failure to diagnose a condition, and creating a complication requiring transfusion or operation were discussed with the patient. The patient concurred with the proposed plan, giving informed consent.  The site of surgery was properly noted/marked if necessary per policy. The patient has been actively warmed in preoperative area. Preoperative antibiotics are not indicated. Venous thrombosis prophylaxis have been ordered including bilateral sequential compression devices    Procedure Details:     The patient was brought back to the operating room and transferred to the operating room table.  The patient was pre-oxygenated with 100% oxygen via facemask.  Following time-out the patient received anesthesia and was intubated with 5-0 laser  safe tube.  The bed was turned 90° to the laryngology team.    A pause was performed confirming procedure.  A moldable tooth guard was placed on the upper dentition.  The glottiscope was introduced transorally along the right side of the tongue.  No concerning masses or lesions were identified in the oral cavity, oropharynx, or hypopharynx.  The laryngoscope was secured on the mustard stand and the microscope was utilized to inspect the airway and used for the remainder of the procedure.    Appropriate eye and face protections was applied to the patient and the operating room staff.  The oxygen level was also at or below 30% for the entirety of the procedure.      Findings noted above. Microflap excisions performed bilaterally with cold instruments.     Under microscopic guidance, the bilateral vocal cords were injected with dexamethasone. The lesion did distend off the underlying SLP.   I then used the KTP laser in pulsed mode for the remainder of the procedure.  The pulsed KTP laser was utilized at a setting of 30W, 15 ms pulse mode and 2 pulses per second to treat the affected areas. A total of 196 joules was utilized.    Rigid bronchoscopy was then performed.    The patient tolerated this well.  The patient was then turned back to Anesthesia for extubation and returned to the recovery room in satisfactory condition.  Sponge, needle, instrument counts were reported as correct.  Estimated blood loss was minimal.  I was present and participated in all aspects of procedure.    Complications:  None; patient tolerated the procedure well.    Disposition: PACU - hemodynamically stable.  Condition: stable     Additional Details: N/A    Attending Attestation: I was present for the entirety of the procedure(s).       Tony Acosta  Phone Number: [phone]

## 2024-08-16 NOTE — PERIOPERATIVE NURSING NOTE
1115 Pt assisted with dressing with help of family. IV removed dressing applied.     1130 Discharge instructions provided to pt and family. Educated on medications, effects of anesthesia, and homecoming care. Pt and family verbalizing understanding of all instructions provided at this time. All questions and concerns answered. Pt given contact information for provider.

## 2024-08-16 NOTE — ANESTHESIA POSTPROCEDURE EVALUATION
Patient: Shelby Davis    Procedure Summary       Date: 08/16/24 Room / Location: [facility] OR 03 / Virtual [facility] OR    Anesthesia Start: 0834 Anesthesia Stop: 1018    Procedure: Microlaryngoscopy; Bronchoscopy; Biopsy; Injection; KTP Laser (Throat) Diagnosis:       Lesion of larynx      (Lesion of larynx [J38.7])    Surgeons: Tony Acosta MD Responsible Provider: Larry Aguiar MD    Anesthesia Type: general ASA Status: 2            Anesthesia Type: general    Vitals Value Taken Time   /80 08/16/24 1102   Temp 36.3 °C (97.3 °F) 08/16/24 1016   Pulse 80 08/16/24 1113   Resp 14 08/16/24 1100   SpO2 96 % 08/16/24 1113   Vitals shown include unfiled device data.    Anesthesia Post Evaluation    Patient participation: complete - patient participated  Level of consciousness: awake  Pain management: satisfactory to patient  Airway patency: patent  Cardiovascular status: acceptable and hemodynamically stable  Respiratory status: acceptable and nonlabored ventilation  Hydration status: balanced  Postoperative Nausea and Vomiting: none        No notable events documented.

## 2024-08-19 ASSESSMENT — PAIN SCALES - GENERAL: PAINLEVEL_OUTOF10: 0 - NO PAIN

## 2024-09-04 LAB
LABORATORY COMMENT REPORT: NORMAL
Lab: NORMAL
PATH REPORT.FINAL DX SPEC: NORMAL
PATH REPORT.GROSS SPEC: NORMAL
PATH REPORT.RELEVANT HX SPEC: NORMAL
PATH REPORT.TOTAL CANCER: NORMAL

## 2024-09-16 ENCOUNTER — APPOINTMENT (OUTPATIENT)
Dept: OTOLARYNGOLOGY | Facility: CLINIC | Age: 34
End: 2024-09-16
Payer: COMMERCIAL

## 2024-09-16 ENCOUNTER — PATIENT MESSAGE (OUTPATIENT)
Dept: OTOLARYNGOLOGY | Facility: CLINIC | Age: 34
End: 2024-09-16
Payer: COMMERCIAL

## 2024-09-16 ENCOUNTER — PATIENT MESSAGE (OUTPATIENT)
Dept: ENT CLINIC | Age: 34
End: 2024-09-16

## 2024-09-16 NOTE — PROGRESS NOTES
ASSESSMENT AND PLAN:   Shelby Davis is a 33 y.o. female with a history of ***           Reason For Consult  No chief complaint on file.       HISTORY OF PRESENT ILLNESS:  Shelby Davis is a 33 y.o. female presenting for a follow-up visit with me for POV.      The patient reports ***.        Prior History:   Patient is s/p Microlaryngoscopy; Bronchoscopy; Biopsy; Injection; KTP Laser on 8/16/24.    8/8/24 Assessment and Plan:  Shelby Davis is a 33 y.o. 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      However, some early glottic cancers can form within a RRP focus. I recommended that we avoid XRT until a second biopsy is performed and we can confirm. She is scheduled for a radiation simulation and I recommended she cancels this for now.   We also discussed that at her age and with the findings seen on her exam, she would benefit from surgical removal rather than XRT (better long term voice/swallow and avoid other toxicity from radiation).   Might need to consider treating one side at a time to avoid scar at anterior commissure.   I would like to see the patient back for the procedure described. She would benefit from post op speech therapy.   Patient and I discussed the risks, benefits and alternatives to surgery and all questions answered.  Cleared to OR.     Pathology Results  FINAL DIAGNOSIS   A.  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     Note: Multiple deeper levels were examined.        B.  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     Note: The specimen is small and tangentially embedded, invasion cannot be entirely ruled out.  Multiple deeper levels were examined.        C.  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     Note: Multiple deeper levels were examined.           Past Medical History  She has no past medical history on file. Surgical History  She has no past surgical history on file.   Social History  She reports that she has never smoked. She has never used smokeless tobacco. She reports that she does not currently use alcohol. She reports that she does not use drugs. Allergies  Patient has no known allergies.     Family History  No family history on file.    Review of Systems  All 10 systems were reviewed and negative except for above.      Last Recorded Vitals  There were no vitals taken for this visit.    Physical Exam  ENT Physical Exam   ENT Physical Exam  Constitutional  Appearance: patient appears well-developed and well-nourished,  Head and Face  Appearance: head appears normal and face appears normal;  Ear  Auricles: right auricle normal; left auricle normal;  Nose  External Nose: nares patent bilaterally;  Oral Cavity/Oropharynx  Lips: normal;  Neck  Neck: neck normal; neck palpation normal;  Respiratory  Inspection: no retractions visible;  Cardiovascular  Inspection: no peripheral edema present;  Neurovestibular  Mental Status: alert and oriented;  Psychiatric: mood normal;  Cranial Nerves: cranial nerves intact;     Relevant Results    Procedures       Time Spent  Prep time on day of patient encounter: 10 minutes  Time spent directly with patient, family or caregiver: 15 minutes  Additional Time Spent on Patient Care Activities/Discussion with SLP re care plan: 5 minutes  Documentation Time: 10 minutes  Other Time Spent: 0 minutes  Total: 40 minutes     Scribe Attestation  By signing my name below, IMeaghan , Scribisidro   attest that this documentation has been prepared under the direction and in the presence of Tony Acosta MD.

## 2024-09-17 ENCOUNTER — TELEPHONE (OUTPATIENT)
Dept: ENT CLINIC | Age: 34
End: 2024-09-17

## 2024-09-19 ENCOUNTER — OFFICE VISIT (OUTPATIENT)
Dept: OTOLARYNGOLOGY | Facility: CLINIC | Age: 34
End: 2024-09-19
Payer: COMMERCIAL

## 2024-09-19 VITALS — BODY MASS INDEX: 35.24 KG/M2 | HEIGHT: 62 IN | TEMPERATURE: 98.1 F | WEIGHT: 191.5 LBS

## 2024-09-19 DIAGNOSIS — J38.3 VOCAL CORD MASS: ICD-10-CM

## 2024-09-19 DIAGNOSIS — J38.7 LEUKOPLAKIA OF LARYNX: Primary | ICD-10-CM

## 2024-09-19 DIAGNOSIS — R49.0 MUSCLE TENSION DYSPHONIA: ICD-10-CM

## 2024-09-19 DIAGNOSIS — R49.0 VOICE HOARSENESS: ICD-10-CM

## 2024-09-19 DIAGNOSIS — R49.8 OTHER VOICE AND RESONANCE DISORDERS: ICD-10-CM

## 2024-09-19 PROCEDURE — 31575 DIAGNOSTIC LARYNGOSCOPY: CPT | Performed by: OTOLARYNGOLOGY

## 2024-09-19 PROCEDURE — 99214 OFFICE O/P EST MOD 30 MIN: CPT | Performed by: OTOLARYNGOLOGY

## 2024-09-19 PROCEDURE — 3008F BODY MASS INDEX DOCD: CPT | Performed by: OTOLARYNGOLOGY

## 2024-09-19 ASSESSMENT — PATIENT HEALTH QUESTIONNAIRE - PHQ9
1. LITTLE INTEREST OR PLEASURE IN DOING THINGS: NOT AT ALL
SUM OF ALL RESPONSES TO PHQ9 QUESTIONS 1 AND 2: 0
2. FEELING DOWN, DEPRESSED OR HOPELESS: NOT AT ALL

## 2024-09-19 NOTE — PROGRESS NOTES
ASSESSMENT AND PLAN:   Shelby Davis is a 33 y.o. 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 Pathology showed low grade dysplasia - no invasion.     Today's examination to include  flexible laryngoscopy demonstrates granuloma of the vocal cord on the right and anterior commissure scarring.     We discussed repeat KTP laser in the OR for access. Did not want to do this in the office.  She may also benefit from steroid injection.      I would like to see the patient back for the procedure.        Reason For Consult  No chief complaint on file.         HISTORY OF PRESENT ILLNESS:  Shelby Davis is a 33 y.o. 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     Prior History:   Pathology: concern for RRP vs SCCa - Path showed at least high grade dysplasia. No invasive component.      Outside Path:  at least high-grade dysplasia and areas concerning for superficial invasion.  (SCCa of TVC -  Ref Dr. Larry Franco from [facility]. )  Hoarse x 2 yrs after COVID  non smoker.     MDL 7/1/24: + SCCa or CIS [id number]     Past Medical History  She has no past medical history on file. Surgical History  She has no past surgical history on file.   Social History  She reports that she has never smoked. She has never used smokeless tobacco. She reports that she does not currently use alcohol. She reports that she does not use drugs. Allergies  Patient has no known allergies.     Family History  No family history on file.    Review of Systems  All 10 systems were reviewed and negative except for above.      Last Recorded Vitals  There were no vitals taken for this visit.    Physical Exam  ENT Physical Exam  Constitutional  Appearance: patient appears well-developed and well-nourished,  Head and Face  Appearance: head appears  normal and face appears normal;  Ear  Auricles: right auricle normal; left auricle normal;  Nose  External Nose: nares patent bilaterally;  Oral Cavity/Oropharynx  Lips: normal;  Neck  Neck: neck normal; neck palpation normal;  Respiratory  Inspection: no retractions visible;  Cardiovascular  Inspection: no peripheral edema present;  Neurovestibular  Mental Status: alert and oriented;  Psychiatric: mood normal;  Cranial Nerves: cranial nerves intact;        Relevant Results  Pathology  FINAL DIAGNOSIS   A.  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     Note: Multiple deeper levels were examined.        B.  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     Note: The specimen is small and tangentially embedded, invasion cannot be entirely ruled out.  Multiple deeper levels were examined.        C.  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     Note: Multiple deeper levels were examined.           This case was also reviewed at the ENT/Thoracic Pathology Consensus Conference, via Zoom, on 9/4/2024.          Procedures     Flexible Laryngoscopy w/ Videostroboscopy    VOICE AND SPEECH CHARACTERISTICS:  Normal spoken speech, (+) severe dysphonia, no roughness, (+) severe breathiness, (+) severe asthenia, (+) severe strain.      Intelligibility: reduced.   Resonance: balanced.   Vocal Loudness: reduced.   Breath Support: poor coordination.    PROCEDURE:    Indications: voice change  Procedure Note      Post-operative Diagnosis: same    Anesthesia: Lidocaine 2% and Herson-Synephrine 1/2%    Endoscopy Type:  Flexible Laryngoscopy    Procedure Details:    The patient was placed in the sitting position.  After topical anesthesia and decongestion, the 4 mm laryngoscope was  passed.  The nasal cavities, nasopharynx, oropharynx, hypopharynx, and larynx were all examined.  Vocal cords were examined during respiration and phonation.  The following findings were noted:      Condition:  Stable.  Patient tolerated procedure well.    Complications:  None  Patient is seated in the exam chair. After adequate topical anesthesia, I advance the flexible endoscope. The examination included evaluation of the donahue, vallecula, base of tongue, pyriforms, post-cricoid area, larynx and immediate subglottis.  Findings : assessment of the nasopharynx, base of tongue/vallecula, pyriform sinuses, post-cricoid area and pharyngeal walls was without lesion or mass, pharyngeal wall contraction is normal and symmetric, and no pooling of secretions  granuloma: 2 (present), ventricular obliteration: 2 (present), erythema/hyperemia: 4 (complete), and TVC edema: 1 (mild)  Gross Arytenoid Movement: symmetric.  Arytenoid Height: normal.   Supraglottic Tension: lateral.  Closure: irregular.    Time Spent  Prep time on day of patient encounter: 10 minutes  Time spent directly with patient, family or caregiver: 15 minutes  Additional Time Spent on Patient Care Activities/Discussion with SLP re care plan: 5 minutes  Documentation Time: 10 minutes  Other Time Spent: 0 minutes  Total: 40 minutes       Scribe Attestation  By signing my name below, IElissa , Scribisidro attest that this documentation has been prepared under the direction and in the presence of Tony Acosta MD.

## 2024-10-18 ENCOUNTER — ANESTHESIA EVENT (OUTPATIENT)
Dept: OPERATING ROOM | Facility: HOSPITAL | Age: 34
End: 2024-10-18
Payer: COMMERCIAL

## 2024-10-18 ENCOUNTER — ANESTHESIA (OUTPATIENT)
Dept: OPERATING ROOM | Facility: HOSPITAL | Age: 34
End: 2024-10-18
Payer: COMMERCIAL

## 2024-10-18 ENCOUNTER — HOSPITAL ENCOUNTER (OUTPATIENT)
Facility: HOSPITAL | Age: 34
Setting detail: OUTPATIENT SURGERY
Discharge: HOME | End: 2024-10-18
Attending: OTOLARYNGOLOGY | Admitting: OTOLARYNGOLOGY
Payer: COMMERCIAL

## 2024-10-18 VITALS
OXYGEN SATURATION: 100 % | HEIGHT: 62 IN | RESPIRATION RATE: 16 BRPM | TEMPERATURE: 97.7 F | BODY MASS INDEX: 35.3 KG/M2 | WEIGHT: 191.8 LBS | SYSTOLIC BLOOD PRESSURE: 136 MMHG | HEART RATE: 87 BPM | DIASTOLIC BLOOD PRESSURE: 82 MMHG

## 2024-10-18 DIAGNOSIS — J38.3 VOCAL CORD MASS: ICD-10-CM

## 2024-10-18 DIAGNOSIS — J38.7 LESION OF LARYNX: Primary | ICD-10-CM

## 2024-10-18 LAB — PREGNANCY TEST URINE, POC: NEGATIVE

## 2024-10-18 PROCEDURE — A4649 SURGICAL SUPPLIES: HCPCS | Performed by: OTOLARYNGOLOGY

## 2024-10-18 PROCEDURE — 31571 LARYNGOSCOP W/VC INJ + SCOPE: CPT | Performed by: OTOLARYNGOLOGY

## 2024-10-18 PROCEDURE — 2500000005 HC RX 250 GENERAL PHARMACY W/O HCPCS: Performed by: OTOLARYNGOLOGY

## 2024-10-18 PROCEDURE — 31622 DX BRONCHOSCOPE/WASH: CPT | Performed by: OTOLARYNGOLOGY

## 2024-10-18 PROCEDURE — 2500000004 HC RX 250 GENERAL PHARMACY W/ HCPCS (ALT 636 FOR OP/ED)

## 2024-10-18 PROCEDURE — 88305 TISSUE EXAM BY PATHOLOGIST: CPT | Mod: TC,AHULAB | Performed by: OTOLARYNGOLOGY

## 2024-10-18 PROCEDURE — 3700000002 HC GENERAL ANESTHESIA TIME - EACH INCREMENTAL 1 MINUTE: Performed by: OTOLARYNGOLOGY

## 2024-10-18 PROCEDURE — 7100000009 HC PHASE TWO TIME - INITIAL BASE CHARGE: Performed by: OTOLARYNGOLOGY

## 2024-10-18 PROCEDURE — 7100000002 HC RECOVERY ROOM TIME - EACH INCREMENTAL 1 MINUTE: Performed by: OTOLARYNGOLOGY

## 2024-10-18 PROCEDURE — 2720000007 HC OR 272 NO HCPCS: Performed by: OTOLARYNGOLOGY

## 2024-10-18 PROCEDURE — 3600000002 HC OR TIME - INITIAL BASE CHARGE - PROCEDURE LEVEL TWO: Performed by: OTOLARYNGOLOGY

## 2024-10-18 PROCEDURE — 88305 TISSUE EXAM BY PATHOLOGIST: CPT | Performed by: STUDENT IN AN ORGANIZED HEALTH CARE EDUCATION/TRAINING PROGRAM

## 2024-10-18 PROCEDURE — 7100000010 HC PHASE TWO TIME - EACH INCREMENTAL 1 MINUTE: Performed by: OTOLARYNGOLOGY

## 2024-10-18 PROCEDURE — 31536 LARYNGOSCOPY W/BX & OP SCOPE: CPT | Performed by: OTOLARYNGOLOGY

## 2024-10-18 PROCEDURE — 2500000004 HC RX 250 GENERAL PHARMACY W/ HCPCS (ALT 636 FOR OP/ED): Performed by: OTOLARYNGOLOGY

## 2024-10-18 PROCEDURE — 3700000001 HC GENERAL ANESTHESIA TIME - INITIAL BASE CHARGE: Performed by: OTOLARYNGOLOGY

## 2024-10-18 PROCEDURE — 7100000001 HC RECOVERY ROOM TIME - INITIAL BASE CHARGE: Performed by: OTOLARYNGOLOGY

## 2024-10-18 PROCEDURE — 3600000007 HC OR TIME - EACH INCREMENTAL 1 MINUTE - PROCEDURE LEVEL TWO: Performed by: OTOLARYNGOLOGY

## 2024-10-18 PROCEDURE — 31541 LARYNSCOP W/TUMR EXC + SCOPE: CPT | Performed by: OTOLARYNGOLOGY

## 2024-10-18 PROCEDURE — 81025 URINE PREGNANCY TEST: CPT | Performed by: OTOLARYNGOLOGY

## 2024-10-18 PROCEDURE — 31545 REMOVE VC LESION W/SCOPE: CPT | Performed by: OTOLARYNGOLOGY

## 2024-10-18 RX ORDER — FENTANYL CITRATE 50 UG/ML
25 INJECTION, SOLUTION INTRAMUSCULAR; INTRAVENOUS EVERY 5 MIN PRN
Status: DISCONTINUED | OUTPATIENT
Start: 2024-10-18 | End: 2024-10-18 | Stop reason: HOSPADM

## 2024-10-18 RX ORDER — DROPERIDOL 2.5 MG/ML
0.62 INJECTION, SOLUTION INTRAMUSCULAR; INTRAVENOUS ONCE AS NEEDED
Status: DISCONTINUED | OUTPATIENT
Start: 2024-10-18 | End: 2024-10-18 | Stop reason: HOSPADM

## 2024-10-18 RX ORDER — ACETAMINOPHEN 500 MG
1000 TABLET ORAL EVERY 6 HOURS PRN
Qty: 30 TABLET | Refills: 0 | Status: SHIPPED | OUTPATIENT
Start: 2024-10-18

## 2024-10-18 RX ORDER — LIDOCAINE HYDROCHLORIDE 10 MG/ML
0.1 INJECTION, SOLUTION EPIDURAL; INFILTRATION; INTRACAUDAL; PERINEURAL ONCE
Status: DISCONTINUED | OUTPATIENT
Start: 2024-10-18 | End: 2024-10-18 | Stop reason: HOSPADM

## 2024-10-18 RX ORDER — IBUPROFEN 600 MG/1
600 TABLET ORAL EVERY 6 HOURS PRN
Qty: 60 TABLET | Refills: 0 | Status: SHIPPED | OUTPATIENT
Start: 2024-10-18

## 2024-10-18 RX ORDER — ONDANSETRON HYDROCHLORIDE 2 MG/ML
4 INJECTION, SOLUTION INTRAVENOUS ONCE AS NEEDED
Status: DISCONTINUED | OUTPATIENT
Start: 2024-10-18 | End: 2024-10-18 | Stop reason: HOSPADM

## 2024-10-18 RX ORDER — FENTANYL CITRATE 50 UG/ML
12.5 INJECTION, SOLUTION INTRAMUSCULAR; INTRAVENOUS EVERY 5 MIN PRN
Status: DISCONTINUED | OUTPATIENT
Start: 2024-10-18 | End: 2024-10-18 | Stop reason: HOSPADM

## 2024-10-18 RX ORDER — SODIUM CHLORIDE, SODIUM LACTATE, POTASSIUM CHLORIDE, CALCIUM CHLORIDE 600; 310; 30; 20 MG/100ML; MG/100ML; MG/100ML; MG/100ML
100 INJECTION, SOLUTION INTRAVENOUS CONTINUOUS
Status: DISCONTINUED | OUTPATIENT
Start: 2024-10-18 | End: 2024-10-18 | Stop reason: HOSPADM

## 2024-10-18 RX ORDER — LIDOCAINE HYDROCHLORIDE 20 MG/ML
INJECTION, SOLUTION EPIDURAL; INFILTRATION; INTRACAUDAL; PERINEURAL AS NEEDED
Status: DISCONTINUED | OUTPATIENT
Start: 2024-10-18 | End: 2024-10-18

## 2024-10-18 RX ORDER — WATER 1 ML/ML
IRRIGANT IRRIGATION AS NEEDED
Status: DISCONTINUED | OUTPATIENT
Start: 2024-10-18 | End: 2024-10-18 | Stop reason: HOSPADM

## 2024-10-18 RX ORDER — FENTANYL CITRATE 50 UG/ML
INJECTION, SOLUTION INTRAMUSCULAR; INTRAVENOUS AS NEEDED
Status: DISCONTINUED | OUTPATIENT
Start: 2024-10-18 | End: 2024-10-18

## 2024-10-18 RX ORDER — ESMOLOL HYDROCHLORIDE 10 MG/ML
INJECTION INTRAVENOUS AS NEEDED
Status: DISCONTINUED | OUTPATIENT
Start: 2024-10-18 | End: 2024-10-18

## 2024-10-18 RX ORDER — ONDANSETRON HYDROCHLORIDE 2 MG/ML
INJECTION, SOLUTION INTRAVENOUS AS NEEDED
Status: DISCONTINUED | OUTPATIENT
Start: 2024-10-18 | End: 2024-10-18

## 2024-10-18 RX ORDER — ROCURONIUM BROMIDE 10 MG/ML
INJECTION, SOLUTION INTRAVENOUS AS NEEDED
Status: DISCONTINUED | OUTPATIENT
Start: 2024-10-18 | End: 2024-10-18

## 2024-10-18 RX ORDER — MIDAZOLAM HYDROCHLORIDE 1 MG/ML
INJECTION INTRAMUSCULAR; INTRAVENOUS AS NEEDED
Status: DISCONTINUED | OUTPATIENT
Start: 2024-10-18 | End: 2024-10-18

## 2024-10-18 RX ORDER — EPINEPHRINE 1 MG/ML
INJECTION INTRAMUSCULAR; INTRAVENOUS; SUBCUTANEOUS AS NEEDED
Status: DISCONTINUED | OUTPATIENT
Start: 2024-10-18 | End: 2024-10-18 | Stop reason: HOSPADM

## 2024-10-18 RX ORDER — DEXAMETHASONE SODIUM PHOSPHATE 10 MG/ML
INJECTION INTRAMUSCULAR; INTRAVENOUS AS NEEDED
Status: DISCONTINUED | OUTPATIENT
Start: 2024-10-18 | End: 2024-10-18 | Stop reason: HOSPADM

## 2024-10-18 RX ORDER — PROPOFOL 10 MG/ML
INJECTION, EMULSION INTRAVENOUS AS NEEDED
Status: DISCONTINUED | OUTPATIENT
Start: 2024-10-18 | End: 2024-10-18

## 2024-10-18 RX ORDER — FENTANYL CITRATE 50 UG/ML
50 INJECTION, SOLUTION INTRAMUSCULAR; INTRAVENOUS EVERY 5 MIN PRN
Status: DISCONTINUED | OUTPATIENT
Start: 2024-10-18 | End: 2024-10-18 | Stop reason: HOSPADM

## 2024-10-18 ASSESSMENT — COLUMBIA-SUICIDE SEVERITY RATING SCALE - C-SSRS
1. IN THE PAST MONTH, HAVE YOU WISHED YOU WERE DEAD OR WISHED YOU COULD GO TO SLEEP AND NOT WAKE UP?: NO
2. HAVE YOU ACTUALLY HAD ANY THOUGHTS OF KILLING YOURSELF?: NO
6. HAVE YOU EVER DONE ANYTHING, STARTED TO DO ANYTHING, OR PREPARED TO DO ANYTHING TO END YOUR LIFE?: NO

## 2024-10-18 ASSESSMENT — PAIN - FUNCTIONAL ASSESSMENT
PAIN_FUNCTIONAL_ASSESSMENT: UNABLE TO SELF-REPORT
PAIN_FUNCTIONAL_ASSESSMENT: 0-10
PAIN_FUNCTIONAL_ASSESSMENT: UNABLE TO SELF-REPORT
PAIN_FUNCTIONAL_ASSESSMENT: 0-10

## 2024-10-18 ASSESSMENT — PAIN SCALES - GENERAL
PAINLEVEL_OUTOF10: 0 - NO PAIN

## 2024-10-18 ASSESSMENT — ENCOUNTER SYMPTOMS
FEVER: 0
CARDIOVASCULAR NEGATIVE: 1
PSYCHIATRIC NEGATIVE: 1

## 2024-10-18 NOTE — ANESTHESIA POSTPROCEDURE EVALUATION
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])    Surgeons: Tony Acosta MD Responsible Provider: Mario Chavez MD    Anesthesia Type: general ASA Status: 2            Anesthesia Type: general    Vitals Value Taken Time   /70 10/18/24 1353   Temp 36 10/18/24 1353   Pulse 90 10/18/24 1353   Resp 16 10/18/24 1353   SpO2 98 10/18/24 1353       Anesthesia Post Evaluation    Patient participation: complete - patient participated  Level of consciousness: awake  Pain management: adequate  Airway patency: patent  Cardiovascular status: acceptable and stable  Respiratory status: acceptable and face mask  Hydration status: acceptable  Postoperative Nausea and Vomiting: none      No notable events documented.

## 2024-10-18 NOTE — DISCHARGE INSTRUCTIONS
".  Postoperative Care Instructions:  Microdirect Laryngoscopy/Bronchoscopy with Laser Surgery for Laryngeal Mass/Lesions    Postoperative Care:  For your surgery, special microscopic instruments were used and an operating telescope was placed in your mouth to look at your vocal cords and trachea to treat your airway. No external incisions made.      It is normal for your voice to be hoarse for several days or weeks after surgery while the healing process occurs.     Your surgeon may also ask you to perform \"Voice rest\" which means no speaking for the period of time requested. Once you are allowed to start talking, it is very important to use a “conservative” or “confidential voice” after surgery to allow your voice to recover.   This means that you are using your normal voice at a much lower volume than usual, without any straining, yelling, screaming, or singing.  This typically sounds like a soft or breathy whisper.  It is also important to avoid extended periods of voice use.   Do not speak to anyone who is farther than an arm's length away, as this would require you to raise your voice.      It is very important to avoid excessive coughing or throat clearing after surgery, as this will slow down the healing process.  If you have an urge to cough that you cannot control on your own with relaxation techniques, you can purchase an over-the-counter cough suppressant to use, but make sure it does not interact with your other medications.      Finally, make sure to drink plenty of water to stay well hydrated after surgery, as this will help to speed your recovery by keeping your secretions thin and your vocal cords moist.  Use of cough lozenges is also allowed.      Diet: You may resume your normal diet after surgery. You may want to start out with liquids and light meals or soft foods and advance to your usual diet as tolerated.     Our Nurse will contact you a few days after surgery to schedule your follow up " appointment, which is typically 3-6 weeks after surgery.  For any questions or concerns, please call the respective office between the hours of 9am-4pm.   Please call:  Dr. Acosta's office @ [phone]  Dr. Alonzo's office @ [phone]   Dr. Cazares's office @ [phone]  If issues arise over the weekend or after hours, please call our hospital  at [phone] and ask for the ENT resident on call.    What to Expect Following Surgery:    The following are some symptoms that may follow your recent surgery:    Pain - Some mild pain is normal after surgery.  As adequate pain control is necessary for healing, please take over-the-counter Tylenol or Motrin per the package directions as needed for pain.  Occasionally, a narcotic pain medication is prescribed for your use after surgery.  If this is the case, please take the medication only as directed.  You may need to take an over-the-counter stool softener as narcotic pain medications can cause constipation. Massage, cold packs, relaxation techniques, listening to music, and positive thinking will also help control your postoperative pain.    Taste disturbance and/or tongue numbness - Due to the surgical instruments used during surgery, you may experience tongue numbness and/or taste disturbance after surgery, but this is usually temporary.  It may take 1-4 weeks to completely resolve.  It is also normal for your tongue to feel swollen or bruised after surgery, and this will also resolve in a few days.    Bleeding - For a few days after surgery, it is normal to cough up some blood in your mucus.  However, if you experience continuous bright red bleeding from your mouth or if you are coughing up blood clots, please call your doctor's office immediately.  If you are on any blood thinning medications, such as Aspirin, Plavix, or Coumadin, you may restart them immediately after surgery unless you were specifically told not to do so by your surgeon.    Muscle  aches/soreness - You may experience generalized muscle aches and/or soreness after surgery, and this is a normal effect of anesthesia.  They should completely resolve after a few days.     Swelling/throat tightness - If you were given steroid medication, this can help with swelling and should be taken as directed. The side effects from steroid use is typically minimal when taken for short periods, as used in these cases. If you have questions, please discuss with your pharmacist.

## 2024-10-18 NOTE — ANESTHESIA PROCEDURE NOTES
Airway  Date/Time: 10/18/2024 12:57 PM  Urgency: elective    Airway not difficult    Staffing  Performed: SHER   Authorized by: Mario Chavez MD    Performed by: SHER Mcnamara  Patient location during procedure: OR    Indications and Patient Condition  Indications for airway management: anesthesia  Spontaneous ventilation: present  Sedation level: deep  Preoxygenated: yes  Patient position: sniffing  Mask difficulty assessment: 1 - vent by mask    Final Airway Details  Final airway type: endotracheal airway      Successful airway: ETT and laser tube  Cuffed: yes   Successful intubation technique: direct laryngoscopy  Facilitating devices/methods: cricoid pressure and intubating stylet  Endotracheal tube insertion site: oral  Blade: Hao  Blade size: #3  ETT size (mm): 5.0  Cormack-Lehane Classification: grade I - full view of glottis  Placement verified by: chest auscultation, capnometry and palpation of cuff   Measured from: lips  ETT to lips (cm): 22  Number of attempts at approach: 1

## 2024-10-18 NOTE — ANESTHESIA PREPROCEDURE EVALUATION
Patient: Shelby Davis    Procedure Information       Date/Time: 10/18/24 1030    Procedure: Microdirect Laryngoscopy; Bronchoscopy; KTP Laser; Biopsy; Injection    Location: [facility] OR 03 / [facility] OR    Surgeons: Tony Acosta MD            Relevant Problems   No relevant active problems       Clinical information reviewed:   Tobacco  Allergies  Meds   Med Hx  Surg Hx  OB Status  Fam Hx  Soc   Hx        NPO Detail:  NPO/Void Status  Carbohydrate Drink Given Prior to Surgery? : N  Date of Last Liquid: 10/17/24  Time of Last Liquid: 2230  Date of Last Solid: 10/17/24  Time of Last Solid: 2130  Last Intake Type: Clear fluids  Time of Last Void: 0911         Physical Exam    Airway  Mallampati: II  TM distance: >3 FB  Neck ROM: full     Cardiovascular    Dental    Pulmonary    Abdominal        Anesthesia Plan    History of general anesthesia?: yes  History of complications of general anesthesia?: no    ASA 2     general     intravenous induction   Anesthetic plan and risks discussed with patient.

## 2024-10-18 NOTE — OP NOTE
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.3]     Procedures  Microdirect Laryngoscopy; Bronchoscopy; KTP Laser; Biopsy; Injection  70721 - CT LARYNGOSCOPY W/WO TRACHEOSCOPY W/MICRO/TELESCOPE    CT LARYNGOSCOPY W/BIOPSY MICROSCOPE/TELESCOPE [09756]  CT LARGSC EXC KWABENA&/STRPG CORDS/EPIGL MCRSCP/TLSCP [30016]  CT LARGSC W/NJX VOCAL CORD THER W/MICRO/TELESCOPE [21015]  CT BRONCHOSCOPY W/TRANSBRONCL NDL ASPIR BX EA LOBE [71951]  Surgeons      * Tony Acosta - Primary    Resident/Fellow/Other Assistant:  Surgeons and Role:  * No surgeons found with a matching role *    Procedure Summary  Anesthesia: General  ASA: II  Anesthesia Staff: Anesthesiologist: Mario Chavez MD  CRNA: Ramírez Daniel APRN-CRNA  C-AA: SHER Mcnamara  Estimated Blood Loss: <2 mL  Intra-op Medications: Administrations occurring from 1030 to 1200 on 10/18/24:  * No intraprocedure medications in log *           Anesthesia Record               Intraprocedure I/O Totals          Intake    LR bolus 800.00 mL    Total Intake 800 mL       Output    Urine 0 mL    Est. Blood Loss 0 mL    Total Output 0 mL       Net    Net Volume 800 mL          Specimen:   ID Type Source Tests Collected by Time   1 : RIGHT VOCAL CORD LESION Tissue VOCAL CORD BIOPSY RIGHT SURGICAL PATHOLOGY EXAM Tony Acosta MD 10/18/2024 1317        Staff:   Circulator: Katie Wells Person: Char Rivera Scrub: Ana      Findings: MDL/B with microflap excision on the right, Bilateral KTP laser.   Lesions had the appearance of RRP with granuloma (right) and mild scarring of the anterior commissure and left ventricle.  Treated visible disease.  Steroid injection performed to bilateral TVC and left  ventricle.     Indications: Shelby Davis is an 33 y.o. 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 History of voice changes and TVC changes consistent with vocal fold mass.  + contralateral mucosal changes.  Poor voice quality and increased effort/fatigue.  Discussed risks, benefits and alternatives and patient wished to proceed with surgical excisional biopsy with KTP laser ablation.   Informed consent was obtained.    The patient was seen in the preoperative area. The risks, benefits, complications, treatment options, non-operative alternatives, expected recovery and outcomes were discussed with the patient. The possibilities of reaction to medication, pulmonary aspiration, injury to surrounding structures, bleeding, recurrent infection, the need for additional procedures, failure to diagnose a condition, and creating a complication requiring transfusion or operation were discussed with the patient. The patient concurred with the proposed plan, giving informed consent.  The site of surgery was properly noted/marked if necessary per policy. The patient has been actively warmed in preoperative area. Preoperative antibiotics are not indicated. Venous thrombosis prophylaxis have been ordered including bilateral sequential compression devices    Procedure in detail: The patient was seen and identified in the preoperative holding area and at that time further questions were answered. The patient was escorted to the operating suite, transferred to the operating table in a supine position. A huddle was taken, verifying the correct patient, surgical site, and procedure. The anesthesia team provided general anesthesia with a 5-0 laser safe endotracheal tube inserted. The patient was turned 90 degrees towards the ENT team.     Prior to the start of the case, a pre-incision pause was taken to again verify the correct patient, procedure and surgical site. In addition, a laser safety time out was performed  prior to use of the laser.  Appropriate eye and face protections was applied to the patient and the operating room staff prior to laser use.  The oxygen level was also at or below 30-35% for the entirety of the laser portions of the case.      A dental guard was placed on the upper dentition.  The Dedo Laryngoscope was introduced transorally along the right and left sides of the tongue.  No concerning masses or lesions were identified in the oral cavity, oropharynx, or hypopharynx. The glottis was exposed.  Anterior cricoid pressure was not needed to expose the anterior commissure.  The laryngoscope was secured on the mustard stand and a 0° telescope was utilized to inspect the airway. The above findings were visualized. Photodocumentation was obtained.      The microscope was moved in position, and utilized for a majority of the below procedure. Attention was first directed to the right true vocal fold.  An injection of 0.2 ml of preservative free saline was performed into the area just below the mass.  This afforded improved visualization of the mucosal changes and mass effect.  A tatyana knife was utilized to create a microflap which was elevated paying close attention to preserving as much SLP as possible. A variety of cold instruments were used to include flap elevators, Bouchier forceps, curved and up biting scissors to excise the lesion.   The specimen was sent for permanent pathology.     Next, the KTP laser was brought into position and utilizing a suction and the KTP laser the mucosa surrounding the excision on the right vocal cord was removed.  All concerning areas were vaporized.  The anterior commissure there was noted to be some scarring from the left to right vocal cord and this was excised as well.  A small area on the left vocal cord was also treated with the KTP laser extending up into the left ventricle.  This had scarring that was impacting the vocal cord and the ventricle.  Removal of the soft  tissue and scar afforded better visualization of the lesion which was absent at the time of completion.  Decadron, 10 mg/cc was injected into the bilateral vocal cords    Hemostasis was confirmed at the end of the case after placement of 1:1000 epi pledgets. The telescope used to inspect the undersurface of the vocal folds and the proximal trachea down to the hardeep. There were no concerning areas below the subglottis.  Photodocumentation was obtained.        This then concluded the operative portion of the procedure and all instruments were removed from the patient. Sponge, needle, instrument counts were reported as correct. The patient was then turned back to anesthesia for awakening and extubation. There were no complications. The patient was transported to the post-anesthesia care unit in stable condition.     Dr. Acosta was present for and participated in all critical aspects of the procedure.     Complications:  None; patient tolerated the procedure well.    Disposition: PACU - hemodynamically stable.  Condition: stable   Attending Attestation: I performed the procedure.    Tony Acosta  Phone Number: [phone]

## 2024-10-18 NOTE — NURSING NOTE
1520 Assuming care of pt at this time. Bedside report received from outgoing RN.   1530 pt ambulated to and from restroom, with successful void.  1535 Pt assisted with dressing with help of family. IV removed dressing applied.   1545 Discharge instructions provided to pt and family. Educated on medications, effects of anesthesia, and homecoming care. Pt and family verbalizing understanding of all instructions provided at this time. All questions and concerns answered. Pt given contact information for provider.   1550 phase 2 care complete. Pt awaiting transport.

## 2024-10-18 NOTE — H&P
History Of Present Illness  Shelby Davis is a 33 y.o. 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 However, pathology showed at least high-grade dysplasia - no invasion.      Post procedure examination to include  flexible laryngoscopy demonstrates granuloma of the vocal cord on the right and anterior commissure scarring.          We discussed repeat KTP laser in the OR for access. Did not want to do this in the office.  She may also benefit from steroid injection.       Prior procedure 8/18/24:    FINAL DIAGNOSIS   A.  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     Note: Multiple deeper levels were examined.        B.  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     Note: The specimen is small and tangentially embedded, invasion cannot be entirely ruled out.  Multiple deeper levels were examined.        C.  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     Note: Multiple deeper levels were examined.          Findings:   Raised, irregular lesion of the anterior commissure and right and left anterior true vocal fold with vascular changes overlying the mucosa suspicious for papilloma  Microflap excision performed of left superior surface of true vocal fold sent for frozen pathology, 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  No invasion.   Microflap excision of right superior/medial surface of true vocal sent for permanent pathology. Irregular-appearing pale and firm tissue encountered at deep aspect of right anterior vocal fold possibly scar versus mass. 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   Unremarkable bronchoscopy down to level of bilateral mainstem bronchi         Right TVC excision with anterior Left mucosal preservation to prevent anterior web.      Past Medical History  No past medical history on file.    Surgical History  No past surgical history on file.     Social History  She reports that she has never smoked. She has never used smokeless tobacco. She reports that she does not currently use alcohol. She reports that she does not use drugs.    Family History  No family history on file.     Allergies  Patient has no known allergies.    Review of Systems   Constitutional:  Negative for fever.   Cardiovascular: Negative.  Negative for chest pain.   Genitourinary: Negative.    Skin: Negative.    Psychiatric/Behavioral: Negative.     All other systems reviewed and are negative.       Physical Exam  Constitutional:       General: She is not in acute distress.     Appearance: She is not ill-appearing.   HENT:      Right Ear: External ear normal.      Left Ear: External ear normal.      Nose: Nose normal.      Mouth/Throat:      Mouth: Mucous membranes are moist.   Eyes:      Pupils: Pupils are equal, round, and reactive to light.   Pulmonary:      Effort: Pulmonary effort is normal.   Skin:     General: Skin is warm and dry.   Neurological:      General: No focal deficit present.   Psychiatric:         Mood and Affect: Mood normal.          Assessment/Plan   Assessment & Plan  Vocal cord mass      Patient and I discussed the risks, benefits and alternatives to surgery and all questions answered.  Cleared to OR.      Tony Acosat MD

## 2024-10-29 LAB
LABORATORY COMMENT REPORT: NORMAL
PATH REPORT.FINAL DX SPEC: NORMAL
PATH REPORT.GROSS SPEC: NORMAL
PATH REPORT.RELEVANT HX SPEC: NORMAL
PATH REPORT.TOTAL CANCER: NORMAL

## 2024-11-14 ENCOUNTER — APPOINTMENT (OUTPATIENT)
Dept: OTOLARYNGOLOGY | Facility: CLINIC | Age: 34
End: 2024-11-14
Payer: COMMERCIAL

## 2024-11-14 ENCOUNTER — EVALUATION (OUTPATIENT)
Dept: SPEECH THERAPY | Facility: CLINIC | Age: 34
End: 2024-11-14
Payer: COMMERCIAL

## 2024-11-14 VITALS — HEIGHT: 62 IN | BODY MASS INDEX: 35.99 KG/M2 | TEMPERATURE: 95.6 F | WEIGHT: 195.6 LBS

## 2024-11-14 DIAGNOSIS — J38.3 VOCAL CORD MASS: ICD-10-CM

## 2024-11-14 DIAGNOSIS — Z08 ENCOUNTER FOR FOLLOW-UP SURVEILLANCE OF LARYNGEAL CANCER: Primary | ICD-10-CM

## 2024-11-14 DIAGNOSIS — R49.0 DYSPHONIA: Primary | ICD-10-CM

## 2024-11-14 DIAGNOSIS — Z85.21 ENCOUNTER FOR FOLLOW-UP SURVEILLANCE OF LARYNGEAL CANCER: Primary | ICD-10-CM

## 2024-11-14 DIAGNOSIS — J38.7 LESION OF LARYNX: ICD-10-CM

## 2024-11-14 DIAGNOSIS — R49.8 OTHER VOICE AND RESONANCE DISORDERS: ICD-10-CM

## 2024-11-14 DIAGNOSIS — R49.8 VOICE FATIGUE: ICD-10-CM

## 2024-11-14 DIAGNOSIS — R49.0 MUSCLE TENSION DYSPHONIA: ICD-10-CM

## 2024-11-14 DIAGNOSIS — R49.0 VOICE HOARSENESS: ICD-10-CM

## 2024-11-14 PROCEDURE — 3008F BODY MASS INDEX DOCD: CPT | Performed by: OTOLARYNGOLOGY

## 2024-11-14 PROCEDURE — 92524 BEHAVRAL QUALIT ANALYS VOICE: CPT | Mod: GN | Performed by: SPEECH-LANGUAGE PATHOLOGIST

## 2024-11-14 PROCEDURE — 31579 LARYNGOSCOPY TELESCOPIC: CPT | Performed by: OTOLARYNGOLOGY

## 2024-11-14 PROCEDURE — 99214 OFFICE O/P EST MOD 30 MIN: CPT | Performed by: OTOLARYNGOLOGY

## 2024-11-14 PROCEDURE — 1036F TOBACCO NON-USER: CPT | Performed by: OTOLARYNGOLOGY

## 2024-11-14 ASSESSMENT — PATIENT HEALTH QUESTIONNAIRE - PHQ9
SUM OF ALL RESPONSES TO PHQ9 QUESTIONS 1 & 2: 0
1. LITTLE INTEREST OR PLEASURE IN DOING THINGS: NOT AT ALL
2. FEELING DOWN, DEPRESSED OR HOPELESS: NOT AT ALL

## 2024-11-14 ASSESSMENT — PAIN - FUNCTIONAL ASSESSMENT: PAIN_FUNCTIONAL_ASSESSMENT: 0-10

## 2024-11-14 ASSESSMENT — PAIN SCALES - GENERAL
PAINLEVEL_OUTOF10: 0-NO PAIN
PAINLEVEL_OUTOF10: 0 - NO PAIN

## 2024-11-14 NOTE — PROGRESS NOTES
"ASSESSMENT AND PLAN:   Shelby Davis is a 33 y.o. 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  We discussed findings of exam and recommended a procedure to remove the scar seen on exam today. He will likely require speech therapy postoperatively and voice rest.     I would like to see the patient back for a procedure. We discussed the r/b/a.        Reason For Consult  Chief Complaint   Patient presents with    Post-op     Vocal cord review.     HISTORY OF PRESENT ILLNESS:  Shelby Davis is a 33 y.o. female presenting for a follow up visit with me for high grade dysplasia and possible superficial invasion.    The patient reports hoarseness.      Prior History:   Post op 10/18/24  Vocal cord, right, biopsy:  - High-grade dysplasia with papillary features and granulation tissue.    Pathology: RRP vs SCCa vs verrucous lesion  At least high grade dysplasia. No invasive component.      Path:  at least high-grade dysplasia and areas concerning for superficial invasion.      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 Ref Dr. Larry Franco from [facility].   Hoarse x 2 yrs after COVID  non smoker.     MDL 7/1/24: + SCCa or CIS [id number]     Past Medical History  She has a past medical history of Lesion of vocal cord and Seasonal allergies. Surgical History  She has a past surgical history that includes Laryngoscopy.   Social History  She reports that she has never smoked. She has never used smokeless tobacco. She reports that she does not currently use alcohol. She reports that she does not use drugs. Allergies  Patient has no known allergies.     Family History  No family history on file.    Review of Systems  All 10 systems were reviewed and negative except for above.      Last Recorded Vitals  Temperature 35.3 °C (95.6 °F), height 1.575 m (5' 2\"), weight 88.7 kg (195 lb 9.6 oz).    Physical Exam  ENT Physical " Exam  Constitutional  Appearance: patient appears well-developed and well-nourished,  Head and Face  Appearance: head appears normal and face appears normal;  Ear  Auricles: right auricle normal; left auricle normal;  Nose  External Nose: nares patent bilaterally;  Oral Cavity/Oropharynx  Lips: normal;  Neck  Neck: neck normal; neck palpation normal;  Respiratory  Inspection: no retractions visible;  Cardiovascular  Inspection: no peripheral edema present;  Neurovestibular  Mental Status: alert and oriented;  Psychiatric: mood normal;  Cranial Nerves: cranial nerves intact;             Procedures     Flexible Laryngoscopy w/ Videostroboscopy    VOICE AND SPEECH CHARACTERISTICS:  Normal spoken speech, (+) severe dysphonia, no roughness, (+) severe breathiness, (+) severe asthenia, (+) severe strain.      Intelligibility: reduced.   Resonance: balanced.   Vocal Loudness: reduced.   Breath Support: decreased.    PROCEDURE:    Indications: voice change  PROCEDURE NOTE: FLEXIBLE LARYNGOSCOPY  I recommended a flexible laryngoscopy based on PE findings, and/or concern for mucosal wave details based upon history and/or for issues associated with hyperreflexic gag on mirror exam concerning for pathology. Risks, benefits, and alternatives were explained. The patient wishes to proceed and gives verbal consent.   Patient is seated in the exam chair. After adequate topical anesthesia, I advance the flexible endoscope. The examination included evaluation of the donahue, vallecula, base of tongue, pyriforms, post-cricoid area, larynx and immediate subglottis.  Findings : assessment of the nasopharynx, base of tongue/vallecula, pyriform sinuses, post-cricoid area and pharyngeal walls was without lesion or mass, pharyngeal wall contraction is normal and symmetric, and no pooling of secretions  subglottic edema:2 (present), granuloma: 2 (present), erythema/hyperemia: 2 (arytenoids), IA thickenin (mild), and TVC edema: 1  (mild)  Gross Arytenoid Movement: symmetric.  Arytenoid Height: normal.   Supraglottic Tension: lateral.  Mass: Granulation tissue in the anterior commissure   Closure: irregular.      Time Spent  Prep time on day of patient encounter: 10 minutes  Time spent directly with patient, family or caregiver: 15 minutes  Additional Time Spent on Patient Care Activities/Discussion with SLP re care plan: 5 minutes  Documentation Time: 10 minutes  Other Time Spent: 0 minutes  Total: 40 minutes       Scribe Attestation  By signing my name below, I, Elissa Hernandez , Scribisidro attest that this documentation has been prepared under the direction and in the presence of Tony Acosta MD.

## 2024-11-14 NOTE — PROGRESS NOTES
Speech-Language Pathology    Voice Evaluation    Patient Name: Shelby Davis  MRN: [id number]  Today's Date: 11/14/2024     Time Calculation  Start Time: 0930  Stop Time: 1000  Time Calculation (min): 30 min      Current Problem:  Patient Active Problem List   Diagnosis    Lesion of larynx    Vocal cord mass    Dysphonia       Voice Assessment:   Shelby Davis is a 33 y.o. 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     We discussed findings of exam and recommended a procedure to remove the scar seen on exam today. He will likely require speech therapy postoperatively and voice rest.      We will work to schedule an OR procedure.     SLP completed consult this date with emphasis on postoperative voice recommendations. We discussed strict modified voice rest with no yelling, shouting, screaming and singing. She will follow with speech therapy for postoperative rehabilitation. All questions answered.     Skilled ST services are medically necessary and ordered by a physician in order to provide vocal hygiene program, decrease phono trauma and promote healthy voicing to encourage active, safe and sustainable vocal participation in the completion of patient's ADLs.      Voice Plan of Care:  Frequency: x1/eowk  Duration: x12 weeks  Number of Visits: 4-8  Recommendations for therapeutic interventions: Speech/Voice exercises, Vocal hygiene program, Oral resonance techniques, Habituation training, Vocal strengthening techniques  Prognosis: Good  Factors affecting prognosis: None  Discussed Plan of Care: Patient, Physician, Discussed risks/benefits with patient/caregiver, Patient/caregiver agreeable with Plan of Care      Subjective   Current Problem:   Shelby Davis is a 33 y.o. female presenting for a follow up visit for high grade dysplasia and possible superficial invasion.       The patient reports hoarseness.              Prior History:   Post op 10/18/24  Vocal cord, right, biopsy:  - High-grade dysplasia with papillary features and granulation tissue.     Pathology: RRP vs SCCa vs verrucous lesion  At least high grade dysplasia. No invasive component.       Path:  at least high-grade dysplasia and areas concerning for superficial invasion.       Prior Bx 7/1/24 with CIS     SCCa of TVC? Ref Dr. Larry Franco from [facility].   Hoarse x 2 yrs after COVID  non smoker.      MDL 7/1/24: + SCCa or CIS [id number]     Past Medical History  She has a past medical history of Lesion of vocal cord and Seasonal allergies. Surgical History  She has a past surgical history that includes Laryngoscopy.   Social History  She reports that she has never smoked. She has never used smokeless tobacco. She reports that she does not currently use alcohol. She reports that she does not use drugs. Allergies  Patient has no known allergies.       General Visit Information:  Patient Class: Outpatient  Living Environment: Home  Arrival: Independent  Ordering Physician: Dr. Acosta  Reason for Referral: dysphonia - s/p lesion excision    Objective     Pain Assessment:  Pain Assessment  Pain Assessment: 0-10  0-10 (Numeric) Pain Score: 0 - No pain    Voice Use Inventory:  Voice misuse/abuse: None  Exposure to Noise: No  Exposure to respiratory irritants: No  Consistent use of singing voice: No  Occupation relies on speaking voice: Yes    Patient Self Assessment:  Daily water intake: Yes  Daily caffeine intake: Yes  Alcohol intake: Yes  Smoking history: No  Reflux history: Yes    Voice Objective:  Motor Speech Production: WFL  Pitch: Inadequate range  Voice Quality: Breathy, Hoarse  Fluency: WFL  Prosody: WFL  Resonance: WFL  Loudness: Inadequate range    Voice Analysis:   Flexible Laryngoscopy w/ Videostroboscopy     VOICE AND SPEECH CHARACTERISTICS:  Normal spoken speech, (+) severe dysphonia, no roughness, (+) severe breathiness, (+) severe  asthenia, (+) severe strain.        Intelligibility: reduced.   Resonance: balanced.   Vocal Loudness: reduced.   Breath Support: decreased.     PROCEDURE:    Indications: voice change  PROCEDURE NOTE: FLEXIBLE LARYNGOSCOPY  I recommended a flexible laryngoscopy based on PE findings, and/or concern for mucosal wave details based upon history and/or for issues associated with hyperreflexic gag on mirror exam concerning for pathology. Risks, benefits, and alternatives were explained. The patient wishes to proceed and gives verbal consent.   Patient is seated in the exam chair. After adequate topical anesthesia, I advance the flexible endoscope. The examination included evaluation of the donahue, vallecula, base of tongue, pyriforms, post-cricoid area, larynx and immediate subglottis.  Findings : assessment of the nasopharynx, base of tongue/vallecula, pyriform sinuses, post-cricoid area and pharyngeal walls was without lesion or mass, pharyngeal wall contraction is normal and symmetric, and no pooling of secretions  subglottic edema:2 (present), granuloma: 2 (present), erythema/hyperemia: 2 (arytenoids), IA thickenin (mild), and TVC edema: 1 (mild)  Gross Arytenoid Movement: symmetric.  Arytenoid Height: normal.   Supraglottic Tension: lateral.  Mass: Granulation tissue in the anterior commissure   Closure: irregular.    Voice Treatment:  Individual(s) Educated: Patient  Verbal Education: Risks/benefits of therapy, Results of testing, Communication strategies  Response to Education: Verbalized understanding, Demonstrated understanding, Needs review  Patient/Caregiver Verbalized Understanding and Agreement: Yes    Patient will increase vocal wellness and decrease phonotrauma in adherence with clinician prescribed vocal hygiene and wellness program per patient report.   Patient will increase ability to produce voice without tension within 5 minute conversational task x80% accuracy as judged by clinician observation  and/or patient report.   Patient will demonstrate independent use of voice/breathing techniques x80% accuracy.           Time In: 5661  Time Out: 1000

## 2024-12-19 DIAGNOSIS — C32.9 LARYNGEAL CANCER (MULTI): ICD-10-CM

## 2024-12-19 DIAGNOSIS — J38.3 VOCAL FOLD SCAR: ICD-10-CM

## 2024-12-19 DIAGNOSIS — J38.3 VOCAL CORD MASS: ICD-10-CM

## 2025-01-09 ENCOUNTER — ANESTHESIA EVENT (OUTPATIENT)
Dept: OPERATING ROOM | Facility: HOSPITAL | Age: 35
End: 2025-01-09
Payer: COMMERCIAL

## 2025-01-09 ASSESSMENT — ENCOUNTER SYMPTOMS
FEVER: 0
PSYCHIATRIC NEGATIVE: 1
CARDIOVASCULAR NEGATIVE: 1

## 2025-01-10 ENCOUNTER — HOSPITAL ENCOUNTER (OUTPATIENT)
Facility: HOSPITAL | Age: 35
Setting detail: OUTPATIENT SURGERY
Discharge: HOME | End: 2025-01-10
Attending: OTOLARYNGOLOGY | Admitting: OTOLARYNGOLOGY
Payer: COMMERCIAL

## 2025-01-10 ENCOUNTER — ANESTHESIA (OUTPATIENT)
Dept: OPERATING ROOM | Facility: HOSPITAL | Age: 35
End: 2025-01-10
Payer: COMMERCIAL

## 2025-01-10 VITALS
SYSTOLIC BLOOD PRESSURE: 123 MMHG | OXYGEN SATURATION: 100 % | RESPIRATION RATE: 14 BRPM | DIASTOLIC BLOOD PRESSURE: 75 MMHG | WEIGHT: 194.22 LBS | HEIGHT: 62 IN | BODY MASS INDEX: 35.74 KG/M2 | TEMPERATURE: 97.3 F | HEART RATE: 79 BPM

## 2025-01-10 DIAGNOSIS — C32.9 LARYNGEAL CANCER (MULTI): ICD-10-CM

## 2025-01-10 DIAGNOSIS — J38.3 VOCAL FOLD SCAR: ICD-10-CM

## 2025-01-10 LAB — PREGNANCY TEST URINE, POC: NEGATIVE

## 2025-01-10 PROCEDURE — 7100000010 HC PHASE TWO TIME - EACH INCREMENTAL 1 MINUTE: Performed by: OTOLARYNGOLOGY

## 2025-01-10 PROCEDURE — 2720000007 HC OR 272 NO HCPCS: Performed by: OTOLARYNGOLOGY

## 2025-01-10 PROCEDURE — 81025 URINE PREGNANCY TEST: CPT | Performed by: OTOLARYNGOLOGY

## 2025-01-10 PROCEDURE — 7100000002 HC RECOVERY ROOM TIME - EACH INCREMENTAL 1 MINUTE: Performed by: OTOLARYNGOLOGY

## 2025-01-10 PROCEDURE — 7100000001 HC RECOVERY ROOM TIME - INITIAL BASE CHARGE: Performed by: OTOLARYNGOLOGY

## 2025-01-10 PROCEDURE — 2500000001 HC RX 250 WO HCPCS SELF ADMINISTERED DRUGS (ALT 637 FOR MEDICARE OP): Performed by: ANESTHESIOLOGY

## 2025-01-10 PROCEDURE — 2500000004 HC RX 250 GENERAL PHARMACY W/ HCPCS (ALT 636 FOR OP/ED): Performed by: ANESTHESIOLOGIST ASSISTANT

## 2025-01-10 PROCEDURE — 3700000001 HC GENERAL ANESTHESIA TIME - INITIAL BASE CHARGE: Performed by: OTOLARYNGOLOGY

## 2025-01-10 PROCEDURE — 3600000002 HC OR TIME - INITIAL BASE CHARGE - PROCEDURE LEVEL TWO: Performed by: OTOLARYNGOLOGY

## 2025-01-10 PROCEDURE — 3600000007 HC OR TIME - EACH INCREMENTAL 1 MINUTE - PROCEDURE LEVEL TWO: Performed by: OTOLARYNGOLOGY

## 2025-01-10 PROCEDURE — 31536 LARYNGOSCOPY W/BX & OP SCOPE: CPT | Performed by: OTOLARYNGOLOGY

## 2025-01-10 PROCEDURE — 88305 TISSUE EXAM BY PATHOLOGIST: CPT | Mod: TC,AHULAB | Performed by: OTOLARYNGOLOGY

## 2025-01-10 PROCEDURE — 31541 LARYNSCOP W/TUMR EXC + SCOPE: CPT | Performed by: OTOLARYNGOLOGY

## 2025-01-10 PROCEDURE — 3700000002 HC GENERAL ANESTHESIA TIME - EACH INCREMENTAL 1 MINUTE: Performed by: OTOLARYNGOLOGY

## 2025-01-10 PROCEDURE — 7100000009 HC PHASE TWO TIME - INITIAL BASE CHARGE: Performed by: OTOLARYNGOLOGY

## 2025-01-10 PROCEDURE — 31622 DX BRONCHOSCOPE/WASH: CPT | Performed by: OTOLARYNGOLOGY

## 2025-01-10 PROCEDURE — 2500000004 HC RX 250 GENERAL PHARMACY W/ HCPCS (ALT 636 FOR OP/ED): Performed by: OTOLARYNGOLOGY

## 2025-01-10 PROCEDURE — 31571 LARYNGOSCOP W/VC INJ + SCOPE: CPT | Performed by: OTOLARYNGOLOGY

## 2025-01-10 RX ORDER — FENTANYL CITRATE 50 UG/ML
50 INJECTION, SOLUTION INTRAMUSCULAR; INTRAVENOUS EVERY 5 MIN PRN
Status: DISCONTINUED | OUTPATIENT
Start: 2025-01-10 | End: 2025-01-10 | Stop reason: HOSPADM

## 2025-01-10 RX ORDER — FENTANYL CITRATE 50 UG/ML
12.5 INJECTION, SOLUTION INTRAMUSCULAR; INTRAVENOUS EVERY 5 MIN PRN
Status: DISCONTINUED | OUTPATIENT
Start: 2025-01-10 | End: 2025-01-10 | Stop reason: HOSPADM

## 2025-01-10 RX ORDER — LIDOCAINE HYDROCHLORIDE 10 MG/ML
0.1 INJECTION, SOLUTION EPIDURAL; INFILTRATION; INTRACAUDAL; PERINEURAL ONCE
Status: DISCONTINUED | OUTPATIENT
Start: 2025-01-10 | End: 2025-01-10 | Stop reason: HOSPADM

## 2025-01-10 RX ORDER — SODIUM CHLORIDE, SODIUM LACTATE, POTASSIUM CHLORIDE, CALCIUM CHLORIDE 600; 310; 30; 20 MG/100ML; MG/100ML; MG/100ML; MG/100ML
100 INJECTION, SOLUTION INTRAVENOUS CONTINUOUS
Status: DISCONTINUED | OUTPATIENT
Start: 2025-01-10 | End: 2025-01-10 | Stop reason: HOSPADM

## 2025-01-10 RX ORDER — ONDANSETRON HYDROCHLORIDE 2 MG/ML
INJECTION, SOLUTION INTRAVENOUS AS NEEDED
Status: DISCONTINUED | OUTPATIENT
Start: 2025-01-10 | End: 2025-01-10

## 2025-01-10 RX ORDER — FENTANYL CITRATE 50 UG/ML
INJECTION, SOLUTION INTRAMUSCULAR; INTRAVENOUS AS NEEDED
Status: DISCONTINUED | OUTPATIENT
Start: 2025-01-10 | End: 2025-01-10

## 2025-01-10 RX ORDER — MAG/ALUMINUM/SOD BICARB/ALGINC 14.2-80MG
1 TABLET,CHEWABLE ORAL
Qty: 200 TABLET | Refills: 0 | Status: SHIPPED | OUTPATIENT
Start: 2025-01-10

## 2025-01-10 RX ORDER — DEXAMETHASONE SODIUM PHOSPHATE 10 MG/ML
INJECTION INTRAMUSCULAR; INTRAVENOUS AS NEEDED
Status: DISCONTINUED | OUTPATIENT
Start: 2025-01-10 | End: 2025-01-10 | Stop reason: HOSPADM

## 2025-01-10 RX ORDER — SODIUM CHLORIDE, SODIUM LACTATE, POTASSIUM CHLORIDE, CALCIUM CHLORIDE 600; 310; 30; 20 MG/100ML; MG/100ML; MG/100ML; MG/100ML
INJECTION, SOLUTION INTRAVENOUS CONTINUOUS PRN
Status: DISCONTINUED | OUTPATIENT
Start: 2025-01-10 | End: 2025-01-10

## 2025-01-10 RX ORDER — METOCLOPRAMIDE HYDROCHLORIDE 5 MG/ML
10 INJECTION INTRAMUSCULAR; INTRAVENOUS ONCE AS NEEDED
Status: DISCONTINUED | OUTPATIENT
Start: 2025-01-10 | End: 2025-01-10 | Stop reason: HOSPADM

## 2025-01-10 RX ORDER — MIDAZOLAM HYDROCHLORIDE 1 MG/ML
INJECTION INTRAMUSCULAR; INTRAVENOUS AS NEEDED
Status: DISCONTINUED | OUTPATIENT
Start: 2025-01-10 | End: 2025-01-10

## 2025-01-10 RX ORDER — ACETAMINOPHEN 325 MG/1
650 TABLET ORAL ONCE
Status: COMPLETED | OUTPATIENT
Start: 2025-01-10 | End: 2025-01-10

## 2025-01-10 RX ORDER — ONDANSETRON HYDROCHLORIDE 2 MG/ML
4 INJECTION, SOLUTION INTRAVENOUS ONCE AS NEEDED
Status: DISCONTINUED | OUTPATIENT
Start: 2025-01-10 | End: 2025-01-10 | Stop reason: HOSPADM

## 2025-01-10 RX ORDER — LIDOCAINE HYDROCHLORIDE 20 MG/ML
INJECTION, SOLUTION EPIDURAL; INFILTRATION; INTRACAUDAL; PERINEURAL AS NEEDED
Status: DISCONTINUED | OUTPATIENT
Start: 2025-01-10 | End: 2025-01-10

## 2025-01-10 RX ORDER — PROPOFOL 10 MG/ML
INJECTION, EMULSION INTRAVENOUS AS NEEDED
Status: DISCONTINUED | OUTPATIENT
Start: 2025-01-10 | End: 2025-01-10

## 2025-01-10 RX ORDER — DEXAMETHASONE SODIUM PHOSPHATE 10 MG/ML
INJECTION INTRAMUSCULAR; INTRAVENOUS AS NEEDED
Status: DISCONTINUED | OUTPATIENT
Start: 2025-01-10 | End: 2025-01-10

## 2025-01-10 RX ORDER — ROCURONIUM BROMIDE 10 MG/ML
INJECTION, SOLUTION INTRAVENOUS AS NEEDED
Status: DISCONTINUED | OUTPATIENT
Start: 2025-01-10 | End: 2025-01-10

## 2025-01-10 RX ORDER — EPINEPHRINE 1 MG/ML
INJECTION INTRAMUSCULAR; INTRAVENOUS; SUBCUTANEOUS AS NEEDED
Status: DISCONTINUED | OUTPATIENT
Start: 2025-01-10 | End: 2025-01-10 | Stop reason: HOSPADM

## 2025-01-10 RX ORDER — FENTANYL CITRATE 50 UG/ML
25 INJECTION, SOLUTION INTRAMUSCULAR; INTRAVENOUS EVERY 5 MIN PRN
Status: DISCONTINUED | OUTPATIENT
Start: 2025-01-10 | End: 2025-01-10 | Stop reason: HOSPADM

## 2025-01-10 RX ADMIN — LIDOCAINE HYDROCHLORIDE 100 MG: 20 INJECTION, SOLUTION EPIDURAL; INFILTRATION; INTRACAUDAL; PERINEURAL at 09:47

## 2025-01-10 RX ADMIN — PROPOFOL 50 MG: 10 INJECTION, EMULSION INTRAVENOUS at 09:47

## 2025-01-10 RX ADMIN — DEXAMETHASONE SODIUM PHOSPHATE 10 MG: 10 INJECTION, SOLUTION INTRAMUSCULAR; INTRAVENOUS at 09:50

## 2025-01-10 RX ADMIN — FENTANYL CITRATE 50 MCG: 50 INJECTION, SOLUTION INTRAMUSCULAR; INTRAVENOUS at 09:50

## 2025-01-10 RX ADMIN — ONDANSETRON 4 MG: 2 INJECTION INTRAMUSCULAR; INTRAVENOUS at 10:08

## 2025-01-10 RX ADMIN — PROPOFOL 28.8 MG: 10 INJECTION, EMULSION INTRAVENOUS at 09:58

## 2025-01-10 RX ADMIN — ROCURONIUM BROMIDE 40 MG: 10 INJECTION, SOLUTION INTRAVENOUS at 09:49

## 2025-01-10 RX ADMIN — ONDANSETRON 4 MG: 2 INJECTION INTRAMUSCULAR; INTRAVENOUS at 10:07

## 2025-01-10 RX ADMIN — SODIUM CHLORIDE, POTASSIUM CHLORIDE, SODIUM LACTATE AND CALCIUM CHLORIDE: 600; 310; 30; 20 INJECTION, SOLUTION INTRAVENOUS at 09:36

## 2025-01-10 RX ADMIN — FENTANYL CITRATE 50 MCG: 50 INJECTION, SOLUTION INTRAMUSCULAR; INTRAVENOUS at 09:45

## 2025-01-10 RX ADMIN — PROPOFOL 30 MG: 10 INJECTION, EMULSION INTRAVENOUS at 09:50

## 2025-01-10 RX ADMIN — PROPOFOL 200 MCG/KG/MIN: 10 INJECTION, EMULSION INTRAVENOUS at 09:48

## 2025-01-10 RX ADMIN — GLYCOPYRROLATE 0.2 MCG: 0.2 INJECTION, SOLUTION INTRAMUSCULAR; INTRAVENOUS at 09:41

## 2025-01-10 RX ADMIN — ACETAMINOPHEN 650 MG: 325 TABLET, FILM COATED ORAL at 11:21

## 2025-01-10 RX ADMIN — SUGAMMADEX 200 MG: 100 INJECTION, SOLUTION INTRAVENOUS at 10:13

## 2025-01-10 RX ADMIN — PROPOFOL 30 MG: 10 INJECTION, EMULSION INTRAVENOUS at 09:54

## 2025-01-10 RX ADMIN — MIDAZOLAM HYDROCHLORIDE 2 MG: 1 INJECTION, SOLUTION INTRAMUSCULAR; INTRAVENOUS at 09:41

## 2025-01-10 ASSESSMENT — PAIN - FUNCTIONAL ASSESSMENT
PAIN_FUNCTIONAL_ASSESSMENT: 0-10

## 2025-01-10 ASSESSMENT — PAIN SCALES - GENERAL
PAINLEVEL_OUTOF10: 0 - NO PAIN
PAINLEVEL_OUTOF10: 3
PAINLEVEL_OUTOF10: 0 - NO PAIN
PAINLEVEL_OUTOF10: 3
PAINLEVEL_OUTOF10: 0 - NO PAIN
PAINLEVEL_OUTOF10: 3
PAINLEVEL_OUTOF10: 0 - NO PAIN
PAINLEVEL_OUTOF10: 3

## 2025-01-10 ASSESSMENT — PAIN DESCRIPTION - DESCRIPTORS: DESCRIPTORS: SORE

## 2025-01-10 ASSESSMENT — COLUMBIA-SUICIDE SEVERITY RATING SCALE - C-SSRS
1. IN THE PAST MONTH, HAVE YOU WISHED YOU WERE DEAD OR WISHED YOU COULD GO TO SLEEP AND NOT WAKE UP?: NO
6. HAVE YOU EVER DONE ANYTHING, STARTED TO DO ANYTHING, OR PREPARED TO DO ANYTHING TO END YOUR LIFE?: NO
2. HAVE YOU ACTUALLY HAD ANY THOUGHTS OF KILLING YOURSELF?: NO

## 2025-01-10 ASSESSMENT — PAIN DESCRIPTION - LOCATION: LOCATION: THROAT

## 2025-01-10 NOTE — H&P
History Of Present Illness  Shelby Davis is a 33 y.o. 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    We discussed findings of exam and recommended a procedure to remove the scar seen on exam today. He will likely require speech therapy postoperatively and voice rest.      I would like to see the patient back for a procedure. We discussed the r/b/a.           FINAL DIAGNOSIS   Vocal cord, right, biopsy:  - High-grade dysplasia with papillary features and granulation tissue.        Surgical History  Past Surgical History:   Procedure Laterality Date    LARYNGOSCOPY          Social History  She reports that she has never smoked. She has never used smokeless tobacco. She reports that she does not currently use alcohol. She reports that she does not use drugs.    Family History  No family history on file.     Allergies  Patient has no known allergies.    Review of Systems   Constitutional:  Negative for fever.   Cardiovascular: Negative.  Negative for chest pain.   Genitourinary: Negative.    Skin: Negative.    Psychiatric/Behavioral: Negative.          Physical Exam  Constitutional:       General: She is not in acute distress.     Appearance: She is not ill-appearing.   HENT:      Right Ear: External ear normal.      Left Ear: External ear normal.      Nose: Nose normal.      Mouth/Throat:      Mouth: Mucous membranes are moist.   Eyes:      Pupils: Pupils are equal, round, and reactive to light.   Pulmonary:      Effort: Pulmonary effort is normal.   Skin:     General: Skin is warm and dry.   Neurological:      General: No focal deficit present.   Psychiatric:         Mood and Affect: Mood normal.          Assessment/Plan   Assessment & Plan  Laryngeal cancer (Multi)    Vocal fold scar      Patient and I discussed the risks, benefits and alternatives to surgery and all questions answered.  Cleared to OR.         Tony Acosta MD

## 2025-01-10 NOTE — ANESTHESIA POSTPROCEDURE EVALUATION
Patient: Reed Davis    Procedure Summary       Date: 01/10/25 Room / Location: [facility] A OR 03 / Virtual U A OR    Anesthesia Start: 0936 Anesthesia Stop: 1021    Procedure: Microdirect Laryngoscopy; Bronchoscopy; Thrive Ventilation; CO2 Laser; Biopsy Injection Diagnosis:       Laryngeal cancer (Multi)      Vocal fold scar      (Laryngeal cancer (Multi) [C32.9])      (Vocal fold scar [J38.3])    Surgeons: Tony Acosta MD Responsible Provider: Mario Chavez MD    Anesthesia Type: general ASA Status: 2            Anesthesia Type: general    Vitals Value Taken Time   /72 01/10/25 1045   Temp 36.2 °C (97.2 °F) 01/10/25 1017   Pulse 79 01/10/25 1045   Resp 16 01/10/25 1045   SpO2 98 % 01/10/25 1045       Anesthesia Post Evaluation    Patient location during evaluation: PACU  Patient participation: complete - patient participated  Level of consciousness: awake  Pain management: adequate  Airway patency: patent  Cardiovascular status: acceptable and hemodynamically stable  Respiratory status: acceptable, spontaneous ventilation and face mask  Hydration status: acceptable  Postoperative Nausea and Vomiting: none    No notable events documented.

## 2025-01-10 NOTE — PERIOPERATIVE NURSING NOTE
1137 Assuming care of pt at this time. Bedside report received from outgoing RN.    1145 Discharge instructions provided to pt and family. Educated on medications, effects of anesthesia, and homecoming care. Pt and family verbalizing understanding of all instructions provided at this time. All questions and concerns answered. Pt given contact information for provider.    1200 Pt assisted with dressing with help of family. IV removed dressing applied. Pt meets criteria to discharge from phase 2     1210 Pt assisted to main lobby via  by transport. Dc in stable condition to home. All belongings taken with pt.

## 2025-01-10 NOTE — DISCHARGE INSTRUCTIONS
.  Postoperative Care Instructions:  Microdirect Laryngoscopy with Removal of Vocal Cord Polyp, Granuloma, Cyst, or Papilloma    Postoperative Care:  For your surgery, special microscopic instruments were used and an operating telescope was placed in your mouth to look at your vocal cords. No external incisions made.      It is normal for your voice to be hoarse for several days or weeks after surgery while the healing process occurs.     Complete voice rest for 4 days. It is very important to use a “conservative” or “confidential voice” after surgery to allow your voice to recover.  This means that you are using your normal voice at a much lower volume than usual, without any straining, yelling, screaming, or singing.  This typically sounds like a soft or breathy whisper.  It is also important to avoid extended periods of voice use.   Do not speak to anyone who is farther than an arm's length away, as this would require you to raise your voice.      It is very important to avoid excessive coughing or throat clearing after surgery, as this will slow down the healing process.  If you have an urge to cough that you cannot control on your own with relaxation techniques, you can purchase an over-the-counter cough suppressant to use, but make sure it does not interact with your other medications.      Finally, make sure to drink plenty of water to stay well hydrated after surgery, as this will help to speed your recovery by keeping your secretions thin and your vocal cords moist.  Use of cough lozenges is also allowed.      Diet: You may resume your normal diet after surgery. You may want to start out with liquids and light meals and advance to your usual diet as tolerated.     Our Nurse will contact you a few days after surgery to schedule your follow up appointment, which is typically 3-6 weeks after surgery.  For any questions or concerns, please call the respective office between the hours of 9am-4pm.   Please  call:  Dr. Acosta's office @ [phone]  Dr. Alonzo's office @ [phone]   Dr. Cazares's office @ [phone]  If issues arise over the weekend or after hours, please call our hospital  at [phone] and ask for the ENT resident on call.    What to Expect Following Surgery:    The following are some symptoms that may follow your recent surgery:    Pain - Some mild pain is normal after surgery.  As adequate pain control is necessary for healing, please take over-the-counter Tylenol or Motrin per the package directions as needed for pain.  Occasionally, a narcotic pain medication is prescribed for your use after surgery.  If this is the case, please take the medication only as directed.  You may need to take an over-the-counter stool softener as narcotic pain medications can cause constipation. Massage, cold packs, relaxation techniques, listening to music, and positive thinking will also help control your postoperative pain.    Taste disturbance and/or tongue numbness - Due to the surgical instruments used during surgery, you may experience tongue numbness and/or taste disturbance after surgery, but this is usually temporary.  It may take 1-4 weeks to completely resolve.  It is also normal for your tongue to feel swollen or bruised after surgery, and this will also resolve in a few days.    Bleeding - For a few days after surgery, it is normal to cough up some blood in your mucus.  However, if you experience continuous bright red bleeding from your mouth or if you are coughing up blood clots, please call your doctor's office immediately.  If you are on any blood thinning medications, such as Aspirin, Plavix, or Coumadin, you may restart them immediately after surgery unless you were specifically told not to do so by your surgeon.    Muscle aches/soreness - You may experience generalized muscle aches and/or soreness after surgery, and this is a normal effect of anesthesia.  They should completely  resolve after a few days.     Swelling/throat tightness - If you were given steroid medication, this can help with swelling and should be taken as directed. The side effects from steroid use is typically minimal when taken for short periods, as used in these cases. If you have questions, please discuss with your pharmacist.

## 2025-01-10 NOTE — PRE-PROCEDURE NOTE
0733: pt had a vagal response post IV insertion, pt placed back onto monitor. Ice pack placed on neck. Dr Chavez notified. Continuing to monitor pt, see charting.

## 2025-01-10 NOTE — ANESTHESIA PREPROCEDURE EVALUATION
Patient: Rede Davis    Procedure Information       Date/Time: 01/10/25 0930    Procedure: Microdirect Laryngoscopy; Bronchoscopy; Thrive Ventilation; CO2 Laser; Biopsy Injection    Location: [facility] OR 03 / [facility] OR    Surgeons: Tony Acosta MD            Relevant Problems   No relevant active problems       Clinical information reviewed:   Tobacco  Allergies  Meds   Med Hx  Surg Hx  OB Status  Fam Hx  Soc   Hx        NPO Detail:  NPO/Void Status  Date of Last Liquid: 01/09/25  Time of Last Liquid: 2230  Date of Last Solid: 01/09/25  Time of Last Solid: 2130         Physical Exam    Airway  Mallampati: I  TM distance: >3 FB  Neck ROM: full     Cardiovascular    Dental    Pulmonary    Abdominal        Anesthesia Plan    History of general anesthesia?: yes  History of complications of general anesthesia?: no    ASA 2     general   (Optiflow  )  intravenous induction   Anesthetic plan and risks discussed with patient.    Plan discussed with CAA.

## 2025-01-10 NOTE — OP NOTE
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]     Procedures  Microdirect Laryngoscopy; Bronchoscopy; Thrive Ventilation; CO2 Laser; Biopsy Injection  28690 - VT LARYNGOSCOPY W/WO TRACHEOSCOPY W/MICRO/TELESCOPE    VT LARGSC EXC KWABENA&/STRPG CORDS/EPIGL MCRSCP/TLSCP [24759]  VT LARGSC W/NJX VOCAL CORD THER W/MICRO/TELESCOPE [33649]  VT LARYNGOSCOPY W/BIOPSY MICROSCOPE/TELESCOPE [16544]  VT BRNCHSC INCL FLUOR GDNCE DX W/CELL WASHG SPX [51425]  Surgeons      * Tony Acosta - Primary    Resident/Fellow/Other Assistant:  Surgeons and Role:  * No surgeons found with a matching role *  Opal Oneil, Resident Otolaryngology      Staff:   Circulator: Denice Wells Person: Kayla Rivera Circulator: Lesly    Anesthesia Staff: Anesthesiologist: Mario Chavez MD  C-AA: SHER Crump    Procedure Summary  Anesthesia: General  ASA: II  Estimated Blood Loss: 2mL  Intra-op Medications:   Administrations occurring from 0930 to 1100 on 01/10/25:   Medication Name Total Dose   EPINEPHrine (Adrenalin) injection 1 mg   dexAMETHasone (Decadron) injection 10 mg   dexAMETHasone (Decadron) PF injection 10 mg/mL 10 mg   fentaNYL (Sublimaze) injection 50 mcg/mL 100 mcg   glycopyrrolate PF (Robinul) injection 0.2 mcg   LR infusion Cannot be calculated   lidocaine PF (Xylocaine-MPF) local injection 2 % 100 mg   midazolam PF (Versed) injection 1 mg/mL 2 mg   ondansetron (Zofran) 2 mg/mL injection 8 mg   propofol (Diprivan) injection 10 mg/mL 500.01 mg   rocuronium (ZeMuron) 50 mg/5 mL injection 40 mg              Anesthesia Record                Intraprocedure I/O Totals       None           Specimen:   ID Type Source Tests Collected by Time   1 : ANTERIOR CONNISURE LESION Tissue SOFT TISSUE RESECTION SURGICAL PATHOLOGY EXAM Tony Acosta MD 1/10/2025 1003      Findings:   Grade 1 view  2. Anterior 1/3 glottic web successfully released with CO2 laser. Small 1mm granuloma on inferior surface of anterior commissure sent for pathology. No concerning leukoplakia/mass.   3. Small keyhole release.  Minimal bleeding cauterized via laser.  1cc 10mg/mL dexamethasone injected  4. Normal subglottis and distal trachea up to hardeep    Indications: Reed Davis is an 34 y.o. 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     The patient was seen in the preoperative area. The risks, benefits, complications, treatment options, non-operative alternatives, expected recovery and outcomes were discussed with the patient. The possibilities of reaction to medication, pulmonary aspiration, injury to surrounding structures, bleeding, recurrent infection, the need for additional procedures, failure to diagnose a condition, and creating a complication requiring transfusion or operation were discussed with the patient. The patient concurred with the proposed plan, giving informed consent.  The site of surgery was properly noted/marked if necessary per policy. The patient has been actively warmed in preoperative area. Preoperative antibiotics are not indicated. Venous thrombosis prophylaxis are not indicated.    Procedure Details:   The patient was seen and identified in the preoperative holding area and at that time further questions were answered. The patient was escorted to the operating suite, transferred to the operating table in a supine position. A huddle was taken, verifying the correct patient, surgical site, and procedure. The anesthesia team provided general anesthesia with thrive ventilation.      Prior to the start of the case, a pre-incision pause was taken to again verify the correct patient, procedure and surgical site. In addition, a laser safety time out was performed prior to use of the laser.  Appropriate eye and face protections was applied to the patient and the operating room staff prior to laser use.  The oxygen level via the THRIVE ventilation throughout the case given that no endotracheal tube was present as an ignition source.     A dental guard was placed on the upper dentition.  The Dedo Laryngoscope was introduced transorally along the right and left sides of the tongue.  No concerning masses or lesions were identified in the oral cavity, oropharynx, or hypopharynx. The glottis was exposed.  Anterior cricoid pressure was needed to expose the anterior commissure.  The laryngoscope was secured on the mustard stand and a 0° telescope was utilized to inspect the airway. The above findings were visualized. Photodocumentation was obtained.      A CO2 laser was used to dissect the anterior commissure to remove the granulation tissue at the anterior commissure and this was sent for pathology. Next, the microscope was moved into position and utilized for the  majority of the procedure. The CO2 laser was used to make releasing cuts along the anteriomedial portion of the anterior true vocal cords to release the glottic web. The vocal cords spread easily and without any palpable firmness or lesions. Once appropriate anterior glottic opening was achieved, 1ml of 10mg/mL dexamethasone was injected into the bilateral anterior vocal cords in a subepithelial manner.     The scope was used to inspect the airway down to the Chana.      This then concluded the operative portion of the procedure and all instruments were removed from the patient. Sponge, needle, instrument counts were reported as correct. The patient was then turned back to anesthesia for awakening. There were no complications. The patient  was transported to the post-anesthesia care unit in stable condition.     Complications:  None; patient tolerated the procedure well.    Disposition: PACU - hemodynamically stable.  Condition: stable   Attending Attestation: I was present for the entirety of the procedure(s).       Tony Acosta  Phone Number: [phone]

## 2025-02-13 ENCOUNTER — APPOINTMENT (OUTPATIENT)
Dept: OTOLARYNGOLOGY | Facility: CLINIC | Age: 35
End: 2025-02-13
Payer: COMMERCIAL

## 2025-02-13 VITALS — WEIGHT: 199.5 LBS | BODY MASS INDEX: 36.71 KG/M2 | HEIGHT: 62 IN

## 2025-02-13 DIAGNOSIS — R49.0 VOICE HOARSENESS: ICD-10-CM

## 2025-02-13 DIAGNOSIS — R49.0 MUSCLE TENSION DYSPHONIA: ICD-10-CM

## 2025-02-13 DIAGNOSIS — J38.3 VOCAL FOLD SCAR: Primary | ICD-10-CM

## 2025-02-13 DIAGNOSIS — R49.8 OTHER VOICE AND RESONANCE DISORDERS: ICD-10-CM

## 2025-02-13 PROCEDURE — 3008F BODY MASS INDEX DOCD: CPT | Performed by: OTOLARYNGOLOGY

## 2025-02-13 PROCEDURE — 31579 LARYNGOSCOPY TELESCOPIC: CPT | Performed by: OTOLARYNGOLOGY

## 2025-02-13 PROCEDURE — 99214 OFFICE O/P EST MOD 30 MIN: CPT | Performed by: OTOLARYNGOLOGY

## 2025-02-13 ASSESSMENT — PATIENT HEALTH QUESTIONNAIRE - PHQ9
2. FEELING DOWN, DEPRESSED OR HOPELESS: NOT AT ALL
1. LITTLE INTEREST OR PLEASURE IN DOING THINGS: NOT AT ALL
SUM OF ALL RESPONSES TO PHQ9 QUESTIONS 1 AND 2: 0

## 2025-02-13 NOTE — PROGRESS NOTES
ASSESSMENT AND PLAN:   Reed Davis is a 34 y.o. female with a history of a verrucoid mass in the anterior commissure removed and subsequently developed anterior TVC scarring. No additional dysplasia on second look procedure. She has an improving voice quality.       Today's examination to include stroboscopy demonstrates chronic anterior scar.  Limited vibration.     We discussed consideration of a keel, but we will hold of on scheduling any procedures at this time to allow for continued scar modification de antoni.      I would like to see the patient back for a repeat evaluation in 6 months.  She is happy to observe for now.     I discussed the case with my Speech-Language Pathologist (SLP) Tyrel Mauro. We jointly reviewed the stroboscopy results, which provided detailed visualization of the patient's vocal fold vibration patterns. Together we collaboratively evaluated treatment options, refined the diagnosis, and identified potential complicating factors based on the stroboscopic findings and patient history. Patient education was provided regarding the condition, proposed interventions, and expected outcomes, ensuring informed decision-making and active patient participation in the treatment process.       Reason For Consult  No chief complaint on file.       HISTORY OF PRESENT ILLNESS:  Reed Davis is a 34 y.o. female presenting for a follow up visit with me s/p excision of a verrucous mass complicated by an anterior glottic web.       The patient reports improvement in her voice. .          Prior History:   Anterior 1/3 glottic web successfully released with CO2 laser. Small 1mm granuloma on inferior surface of anterior commissure sent for pathology. No concerning leukoplakia/mass.     Small keyhole release.  Minimal bleeding cauterized via laser.  1cc 10mg/mL dexamethasone injected    Normal subglottis and distal trachea up to hardeep    Anterior commissure lesion, biopsy:  - Granulation tissue with  acute and chronic inflammation.  See note.  - No malignancy identified.     Past Medical History  She has a past medical history of Laryngeal cancer (Multi), Lesion of vocal cord, and Seasonal allergies. Surgical History  She has a past surgical history that includes Laryngoscopy.   Social History  She reports that she has never smoked. She has never used smokeless tobacco. She reports that she does not currently use alcohol. She reports that she does not use drugs. Allergies  Patient has no known allergies.     Family History  No family history on file.    Review of Systems  All 10 systems were reviewed and negative except for above.      Last Recorded Vitals  There were no vitals taken for this visit.    Physical Exam  ENT Physical Exam  Constitutional  Appearance: patient appears well-developed and well-nourished,  Head and Face  Appearance: head appears normal and face appears normal;  Ear  Auricles: right auricle normal; left auricle normal;  Nose  External Nose: nares patent bilaterally;  Oral Cavity/Oropharynx  Lips: normal;  Neck  Neck: neck normal; neck palpation normal;  Respiratory  Inspection: no retractions visible;  Cardiovascular  Inspection: no peripheral edema present;  Neurovestibular  Mental Status: alert and oriented;  Psychiatric: mood normal;  Cranial Nerves: cranial nerves intact;        Relevant Results   Pathology  Post op 10/18/24  Vocal cord, right, biopsy:  - High-grade dysplasia with papillary features and granulation tissue.    Pathology 8/16/24: RRP vs SCCa vs verrucous lesion  At least high grade dysplasia. No invasive component.      Path:  at least high-grade dysplasia and areas concerning for superficial invasion.      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 Ref Dr. Larry Franco from [facility].   Hoarse x 2 yrs after COVID  non smoker.     MDL 7/1/24: + SCCa or CIS [id number]     Procedures   Flexible Laryngoscopy w/ Videostroboscopy    VOICE AND SPEECH  CHARACTERISTICS:  Normal spoken speech, (+) moderate dysphonia, (+) mild roughness, (+) mild breathiness, (+) moderate asthenia, (+) severe strain.      Intelligibility: reduced.   Resonance: balanced.   Vocal Loudness: reduced.   Breath Support: poor coordination.    PROCEDURE:    Indications: voice change  PROCEDURE NOTE: FLEXIBLE LARYNGOSCOPY WITH STROBOSCOPY  I recommended a flexible laryngoscopy with stroboscopy based on PE findings, and/or concern for mucosal wave details based upon history and/or for issues associated with hyperreflexic gag on mirror exam concerning for pathology. Risks, benefits, and alternatives were explained. The patient wishes to proceed and gives verbal consent.   Patient is seated in the exam chair. After adequate topical anesthesia, I advance the flexible endoscope. The examination included evaluation of the donahue, vallecula, base of tongue, pyriforms, post-cricoid area, larynx and immediate subglottis.  Findings : assessment of the nasopharynx, base of tongue/vallecula, pyriform sinuses, post-cricoid area and pharyngeal walls was without lesion or mass, pharyngeal wall contraction is normal and symmetric, and no pooling of secretions  ventricular obliteration: 2 (present), erythema/hyperemia: 4 (complete), IA thickenin (mild), and TVC edema: 1 (mild)  Gross Arytenoid Movement: symmetric.  Arytenoid Height: normal.   Supraglottic Tension: lateral.  Symmetry: asymmetry.   Amplitude: reduced: bilateral.  Phase Closure: in-phase.  Mucosal Wave Lateral Excursion/Secondary Wave: Bilateral Vocal Cord: moderate restriction - Wave moved up to ¼ the width of the vocal fold.  Periodicity: aperiodic.  Closure: closed.    Time Spent  Prep time on day of patient encounter: 10 minutes  Time spent directly with patient, family or caregiver: 15 minutes  Additional Time Spent on Patient Care Activities/Discussion with SLP re care plan: 5 minutes  Documentation Time: 10 minutes  Other Time Spent: 0  minutes  Total: 40 minutes     Scribe Attestation  By signing my name below, I, Elissa Hernandez , Scribe attest that this documentation has been prepared under the direction and in the presence of Tony Acosta MD.

## 2025-08-14 ENCOUNTER — APPOINTMENT (OUTPATIENT)
Dept: OTOLARYNGOLOGY | Facility: CLINIC | Age: 35
End: 2025-08-14
Payer: COMMERCIAL

## 2025-08-14 VITALS — HEIGHT: 62 IN | WEIGHT: 195 LBS | BODY MASS INDEX: 35.88 KG/M2

## 2025-08-14 DIAGNOSIS — C32.9 LARYNGEAL CANCER (MULTI): ICD-10-CM

## 2025-08-14 DIAGNOSIS — R49.0 MUSCLE TENSION DYSPHONIA: ICD-10-CM

## 2025-08-14 DIAGNOSIS — J38.3 VOCAL FOLD SCAR: ICD-10-CM

## 2025-08-14 DIAGNOSIS — J38.7 LEUKOPLAKIA OF LARYNX: ICD-10-CM

## 2025-08-14 DIAGNOSIS — R49.0 VOICE HOARSENESS: Primary | ICD-10-CM

## 2025-08-14 DIAGNOSIS — R49.8 VOICE FATIGUE: ICD-10-CM

## 2025-08-14 PROCEDURE — 31579 LARYNGOSCOPY TELESCOPIC: CPT | Performed by: OTOLARYNGOLOGY

## 2025-08-14 PROCEDURE — 3008F BODY MASS INDEX DOCD: CPT | Performed by: OTOLARYNGOLOGY

## 2025-08-14 PROCEDURE — 99214 OFFICE O/P EST MOD 30 MIN: CPT | Performed by: OTOLARYNGOLOGY

## 2026-02-19 ENCOUNTER — APPOINTMENT (OUTPATIENT)
Dept: OTOLARYNGOLOGY | Facility: CLINIC | Age: 36
End: 2026-02-19
Payer: COMMERCIAL

## (undated) DEVICE — NEEDLE, FILTER, BLUNT, 5 MIC, 18 G X 1.5 IN

## (undated) DEVICE — SYRINGE, 50 CC, LUER LOCK

## (undated) DEVICE — MARKER, SKIN, DUAL TIP INK W/9 LABEL AND REMOVABLE TIME OUT SLEEVE

## (undated) DEVICE — MARKER,SKIN,WI/RULER AND LABELS: Brand: MEDLINE

## (undated) DEVICE — NEEDLE, HYPODERMIC, MONOJECT, 18 G X 1.5 IN

## (undated) DEVICE — Device

## (undated) DEVICE — GLOVE ORANGE PI 8 1/2   MSG9085

## (undated) DEVICE — SYRINGE, 1 CC, LUER LOCK

## (undated) DEVICE — KTP/YAG KTP-400

## (undated) DEVICE — HOLDERS JAKO

## (undated) DEVICE — SOLUTION, IRRIGATION, SODIUM CHLORIDE 0.9%, 1000 ML, POUR BOTTLE

## (undated) DEVICE — TUBING, SUCTION, NON-CONDUCTIVE, W/CONNECT,.25 IN X 12 FT, STERILE, LF

## (undated) DEVICE — TUBING, SUCTION, CONNECTING, STERILE 0.25 X 120 IN., LF

## (undated) DEVICE — CONTAINER, SPECIMEN, 120 ML, STERILE

## (undated) DEVICE — PAD, EYE, OVAL, 1 5/8 X 2 5/8 IN, STERILE

## (undated) DEVICE — TOWEL,OR,DSP,ST,BLUE,STD,6/PK,12PK/CS: Brand: MEDLINE

## (undated) DEVICE — NEEDLE, INJECTION, OROTRACHEAL

## (undated) DEVICE — DENTITION PROTECTOR, QUICKGUARD, NON-PERF

## (undated) DEVICE — KIT,ANTI FOG,W/SPONGE & FLUID,SOFT PACK: Brand: MEDLINE

## (undated) DEVICE — SUCTION LARYNGEAL OPEN ENDED SHRT

## (undated) DEVICE — SYRINGE, 1 CC, TUBERCULIN, LUER SLIP

## (undated) DEVICE — FILTER PAPER CASSETTE BIOP PLSTC PNK HNGD

## (undated) DEVICE — 4-PORT MANIFOLD: Brand: NEPTUNE 2

## (undated) DEVICE — SUCTION MICROLARYNGEAL SHRT

## (undated) DEVICE — SOLUTION KIT, ANTIFOG, 6CC, LF

## (undated) DEVICE — SYRINGE, HYPODERMIC, TB, W/O NEEDLE, 1 CC, SLIP TIP

## (undated) DEVICE — DRAPE, INSTRUMENT, W/POUCH, STERI DRAPE, 7 X 11 IN, DISPOSABLE, STERILE

## (undated) DEVICE — DRESSING, NON-ADHERENT, TELFA, OUCHLESS, 3 X 8 IN, STERILE

## (undated) DEVICE — BASIC SINGLE BASIN 1-LF: Brand: MEDLINE INDUSTRIES, INC.

## (undated) DEVICE — NEEDLE, HYPODERMIC, REGULAR WALL, REGULAR BEVEL, 18 G X 1.5 IN

## (undated) DEVICE — SURGICAL PROCEDURE PACK EENT CUST

## (undated) DEVICE — ANTIFOG, SOLUTION, FOG-OUT

## (undated) DEVICE — 400U BARE LASER FIBER

## (undated) DEVICE — CORD FIBEROPTIC ENT

## (undated) DEVICE — SUCTION VELVET EYE SHRT